# Patient Record
Sex: MALE | Race: WHITE | NOT HISPANIC OR LATINO | Employment: STUDENT | URBAN - METROPOLITAN AREA
[De-identification: names, ages, dates, MRNs, and addresses within clinical notes are randomized per-mention and may not be internally consistent; named-entity substitution may affect disease eponyms.]

---

## 2017-01-16 ENCOUNTER — TELEPHONE (OUTPATIENT)
Dept: PEDIATRICS | Facility: CLINIC | Age: 22
End: 2017-01-16

## 2017-01-16 NOTE — TELEPHONE ENCOUNTER
----- Message from Huong Figueredo sent at 1/16/2017 11:20 AM CST -----  Contact: Mom Carol   Needs copy of Shot Record for Chago Douglas 1995 and Cameron Douglas 4/6/1998. Mom would like 2 copies of each one. Thanks

## 2017-06-10 ENCOUNTER — TELEPHONE (OUTPATIENT)
Dept: PEDIATRICS | Facility: CLINIC | Age: 22
End: 2017-06-10

## 2017-06-10 NOTE — TELEPHONE ENCOUNTER
----- Message from Carmen Posey sent at 6/10/2017  8:44 AM CDT -----  Contact: Carol santo [phone]  Mom is calling to get an updated shot record

## 2017-07-25 ENCOUNTER — OFFICE VISIT (OUTPATIENT)
Dept: PEDIATRICS | Facility: CLINIC | Age: 22
End: 2017-07-25
Payer: COMMERCIAL

## 2017-07-25 VITALS
WEIGHT: 157.88 LBS | BODY MASS INDEX: 22.1 KG/M2 | SYSTOLIC BLOOD PRESSURE: 129 MMHG | HEART RATE: 67 BPM | DIASTOLIC BLOOD PRESSURE: 67 MMHG | HEIGHT: 71 IN

## 2017-07-25 DIAGNOSIS — F90.2 ATTENTION DEFICIT HYPERACTIVITY DISORDER (ADHD), COMBINED TYPE: ICD-10-CM

## 2017-07-25 PROCEDURE — 99213 OFFICE O/P EST LOW 20 MIN: CPT | Mod: S$GLB,,, | Performed by: PEDIATRICS

## 2017-07-25 RX ORDER — AZITHROMYCIN 250 MG/1
TABLET, FILM COATED ORAL
Refills: 0 | COMMUNITY
Start: 2017-07-17 | End: 2019-03-01 | Stop reason: ALTCHOICE

## 2017-07-25 RX ORDER — OXYCODONE AND ACETAMINOPHEN 7.5; 325 MG/1; MG/1
TABLET ORAL
Refills: 0 | COMMUNITY
Start: 2017-07-17 | End: 2019-03-01

## 2017-07-25 RX ORDER — ONDANSETRON 4 MG/1
TABLET, ORALLY DISINTEGRATING ORAL
Refills: 0 | COMMUNITY
Start: 2017-07-17 | End: 2019-03-01

## 2017-07-25 RX ORDER — DEXTROAMPHETAMINE SACCHARATE, AMPHETAMINE ASPARTATE MONOHYDRATE, DEXTROAMPHETAMINE SULFATE AND AMPHETAMINE SULFATE 2.5; 2.5; 2.5; 2.5 MG/1; MG/1; MG/1; MG/1
10 CAPSULE, EXTENDED RELEASE ORAL DAILY
Qty: 30 CAPSULE | Refills: 0 | Status: SHIPPED | OUTPATIENT
Start: 2017-07-25 | End: 2017-12-02 | Stop reason: SDUPTHER

## 2017-07-25 NOTE — PROGRESS NOTES
Subjective:     History of Present Illness:  Chago Douglas is a 21 y.o. male who presents to the clinic today for medication check (tanisha and bm- good. nehal in college.  brought in by self)     History was provided by the patient. Pt well known to practice.  Chago here for a med check-taking Adderall XR 10 mg daily. Will be a nehal at  this year. Was doing well on this dose last year, no c/o side effects. Was sleeping and eating well. Normal growth curve, normal BP.    Review of Systems   Constitutional: Negative.    HENT: Negative.    Respiratory: Negative.    Neurological: Negative.    Psychiatric/Behavioral: Negative.        Objective:     Physical Exam   Constitutional: He is oriented to person, place, and time. He appears well-developed and well-nourished.   Cardiovascular: Normal rate.    Pulmonary/Chest: Effort normal and breath sounds normal.   Neurological: He is alert and oriented to person, place, and time.   Skin: Skin is warm and dry.       Assessment and Plan:     Attention deficit hyperactivity disorder (ADHD), combined type  -     dextroamphetamine-amphetamine (ADDERALL XR) 10 MG 24 hr capsule; Take 1 capsule (10 mg total) by mouth once daily.  Dispense: 30 capsule; Refill: 0        Return in about 6 months (around 1/25/2018).

## 2017-12-02 DIAGNOSIS — F90.2 ATTENTION DEFICIT HYPERACTIVITY DISORDER (ADHD), COMBINED TYPE: ICD-10-CM

## 2017-12-02 RX ORDER — DEXTROAMPHETAMINE SACCHARATE, AMPHETAMINE ASPARTATE MONOHYDRATE, DEXTROAMPHETAMINE SULFATE AND AMPHETAMINE SULFATE 2.5; 2.5; 2.5; 2.5 MG/1; MG/1; MG/1; MG/1
10 CAPSULE, EXTENDED RELEASE ORAL DAILY
Qty: 30 CAPSULE | Refills: 0 | Status: SHIPPED | OUTPATIENT
Start: 2017-12-02 | End: 2017-12-05 | Stop reason: SDUPTHER

## 2017-12-02 NOTE — TELEPHONE ENCOUNTER
----- Message from Huong Figueredo sent at 12/2/2017 10:08 AM CST -----  Contact: Hieu Medina   Refill on ADDERALL XR 10mg--#23--[facility]. (PLEASE NOTE THIS IS A NEW PHARMACY THAT IS NOT LISTED IN CHART) Thanks

## 2017-12-05 ENCOUNTER — DOCUMENTATION ONLY (OUTPATIENT)
Dept: PEDIATRICS | Facility: CLINIC | Age: 22
End: 2017-12-05

## 2017-12-05 DIAGNOSIS — F90.2 ATTENTION DEFICIT HYPERACTIVITY DISORDER (ADHD), COMBINED TYPE: ICD-10-CM

## 2017-12-05 RX ORDER — DEXTROAMPHETAMINE SACCHARATE, AMPHETAMINE ASPARTATE MONOHYDRATE, DEXTROAMPHETAMINE SULFATE AND AMPHETAMINE SULFATE 2.5; 2.5; 2.5; 2.5 MG/1; MG/1; MG/1; MG/1
10 CAPSULE, EXTENDED RELEASE ORAL DAILY
Qty: 30 CAPSULE | Refills: 0 | Status: SHIPPED | OUTPATIENT
Start: 2017-12-05 | End: 2018-07-23

## 2017-12-05 NOTE — TELEPHONE ENCOUNTER
----- Message from Savi Rock sent at 12/5/2017  8:50 AM CST -----  Contact: hansa Carol   Mom called a prescription in on  Saturday. Adderall xr 10 mg #23. It was called in to the wrong pharmacy. Mom wants this one prescription sent to a new pharmacy. [facility] in [place].

## 2017-12-05 NOTE — PROGRESS NOTES
Pa was approved and faxed to [facility]. Exp on 12/04/18    PA FOR ADDERALL 10MG WAS SUBMITTED ON [facility]OVER [facility]

## 2018-05-08 ENCOUNTER — TELEPHONE (OUTPATIENT)
Dept: PEDIATRICS | Facility: CLINIC | Age: 23
End: 2018-05-08

## 2018-05-08 NOTE — TELEPHONE ENCOUNTER
----- Message from Carmen Posey sent at 5/8/2018  3:09 PM CDT -----  Contact: Carol santo [phone]  Needs rx refill dextroamphetamine-amphetamine (ADDERALL XR) 10 MG 24 hr capsule, [facility] [id number] [facility], [place] - [address] AT [facility] [address], Dr Jones writes the child's rx

## 2018-07-02 DIAGNOSIS — M25.511 ACUTE PAIN OF RIGHT SHOULDER: Primary | ICD-10-CM

## 2018-07-03 ENCOUNTER — CLINICAL SUPPORT (OUTPATIENT)
Dept: REHABILITATION | Facility: HOSPITAL | Age: 23
End: 2018-07-03
Attending: ORTHOPAEDIC SURGERY
Payer: COMMERCIAL

## 2018-07-03 DIAGNOSIS — M25.511 ACUTE PAIN OF RIGHT SHOULDER: Primary | ICD-10-CM

## 2018-07-03 PROCEDURE — 97110 THERAPEUTIC EXERCISES: CPT

## 2018-07-03 PROCEDURE — 97161 PT EVAL LOW COMPLEX 20 MIN: CPT

## 2018-07-03 NOTE — PLAN OF CARE
[facility]  Physical Therapy Initial Evaluation    Name: Chago Douglas  Clinic Number: [id number]    Therapy Diagnosis:   Encounter Diagnosis   Name Primary?    Acute pain of right shoulder Yes     Physician: Genesis Nelson MD    Physician Orders: PT Eval and Treat    Medical Diagnosis: Acute pain of the right shoulder   Date of Surgery:nil  Evaluation Date: 7/3/2018  Authorization Period Expiration: 12/31/18  Plan of Care Certification Period: 10/3/18  Visit # / Visits authorized: 1/ 25    Time In: 905am  Time Out: 1000  Total Billable Time: 45 minutes    Precautions: Standard    Subjective   Date of onset: three weeks  History of current condition - Chago reports:  Began throwing in preparation for summer league ball and noticed right shoulder pain.  Pt states that he did rest and attempted to throw again but continued to have right shoulder soreness.  Denies history of right shoulder pain.  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         No past medical history on file.  Chago Douglas  has no past surgical history on file.    Chago has a current medication list which includes the following prescription(s): azithromycin, dextroamphetamine-amphetamine, ondansetron, and oxycodone-acetaminophen.    Review of patient's allergies indicates:   Allergen Reactions    Augmentin [amoxicillin-pot clavulanate]         Imaging, none:      Prior Therapy: no  Social History:   lives with their family  Occupation: student   Prior Level of Function: pain free throwing  Current Level of Function: unable to throw without pain in the right shoulder.     Pain:  Current 0/10, worst 8/10, best 0/10   Location: right shoulder   Description: Sharp  Aggravating Factors: throwing   Easing Factors: rest    Pts goals: pain free throwing     Objective       Neurologicalc Screening- Sensory  Right   Left      LEVEL  WNL  Dim.  Absent  WNL  Dim.  Absent    L1 (inguinal area)  x   x      L2 (anterior mid-thigh)  x   x     L3 (distal anterior thigh)  x   x     L4 (medial lower leg/foot)  x   x     L5 (lateral leg/ foot)  x   x     S1 (lateral side of foot)  x   x                       Neurologicalc Screening- Sensory        LEVEL  WNL  Dim.  Absent  WNL  Dim.  Absent    C4 (Skin over AC jt) x   x     C5 (radial side of elbow) x   x     C6 (Dorsal surface of thumb) x   x     C7 (Dorsal 3rd digit) x   x     C8 (Dorsal 5th digit) x   x             Myotomes:   Myotome  RIGHT    Left     MUSCLE TEST  WNL  Dim.  Pain  WNL  Dim.  Pain    Shoulder Abduction (C5) x   x     Elbow Flex (C6) x   x     Wrist Ext (C7) x   x     Finger Flex (C8) x   x     Finger Abd (TI) x   x     Hip Flexion (L2-L3)  x   x     Knee Extension (L3-L4)  x   x     Dorsiflexion (L4)  x   x     Hallux Extension (L5)  x   x     Ankle Eversion (S1-S2)  x   x            DTR WNL  Dim.  Absent    Right Bicep x     Left Bicep x     Right Tricep x     Left Tricep x     Right Brachiorad x     Left Brachiorad x     Right-Quad  x     Left-Quad  x     Right Ankle  x     Left Ankle  x          ROM: Active/PROM     Cervical Range of Motion Right  Left  Norms(°)   Rotation WNL  90   Side Bending WNL  25-40   Flexion WNL  80   Extension  WNL   70       Shoulder ROM  Right  Left    Flexion 175 175   Abduction  160 160   Extension  10 10   IR in 90 Abd 40 70   ER in 90 Abd 120 90   Total motion  160 160   Horiz Add  10 20               Strength:     Shoulder  MMT Right  Left    Flexion 5/5 5/5   Abduction  5/5 5/5   Adduction  5/5 5/5   ER 4/5 5/5   IR 5/5 5/5   Scaption  4/5 5/5   Horiz Abd 4/5 5/5   Horiz ADD  5/5 5/5   Extension  5/5 5/5   ER at 90/90 4/5 5/5         Special Tests:    Special Test Shoulder  Right  Left    Yergason's neg neg   Ant Slide neg neg   Compression Rotation  neg neg   Apprehesion  neg neg   Biceps Load II  neg neg   Over 60 yrs neg neg   Infraspinatus Test  neg neg   Painful Arc  pos neg   Drop Arm  neg neg   Khushi  Nba  neg neg   Active Compression  neg neg        TOS Special Test  Right  Left    Adson's  neg neg   Hyperabduction  neg neg   Chhaya neg neg   Tinels neg neg   Costoclavicular  neg neg       Bilateral SLR for tight hamstrings   Juan C test + for hip flexor, quad, and IT band tightness   +obers bilateral               CMS Impairment/Limitation/Restriction for FOTO Shoulder Survey  Status Limitation G-Code CMS Severity Modifier  Intake 83% 17% Current Status CI - At least 1 percent but less than 20 percent  Predicted 89% 11% Goal Status+ CI - At least 1 percent but less than 20 percent  D/C Status CI **only report if this is a one time visit    Therapist reviewed FOTO scores for Chago Douglas on 7/3/2018.   FOTO documents entered into [employer] - see Media section.    Limitation Score: 17  Category: Carrying    Current : CI = at least 1% but < 20% impaired, limited or restricted  Goal: CI = at least 1% but < 20% impaired, limited or restricted  Discharge: na         TREATMENT   Treatment Time In: 930  Treatment Time Out: 1000  Total Treatment time separate from Evaluation time:30    Chago received therapeutic exercises to develop strength, endurance, ROM and flexibility for 15 minutes including:  SL ER 2lbs 3x15   IR stretch 3 min   Cross body stretch with scap stabilized 3 min       Chago received the following manual therapy techniques: Joint mobilizations were applied to the: right shoulder for 15 minutes, including:  Scap mobilization 5 min   GH a/p gd IV 3 min   Tspine a/p at CT junction 3 min       Chago received cold pack for 10 minutes to to decrease circulation, pain, and swelling.    Education provided re: rest    Written Home Exercises Provided: no.      See EMR under patient instructions for exercises given.   Assessment   Chago is a 22 y.o. male referred to outpatient Physical Therapy with a medical diagnosis of acute right shoulder pain. Pt presents with signs and symptoms associated with inflammation of the  right shoulder, possibly the RTC and biceps tendon.  Pt states that he took 2-3 weeks off from throwing and attempted to throw in a summer league game with only three days of arm prep.  He denies performing and RTC/Scap exercises during those three off weeks.  Right shoulder inflammation may be associated with inappropriate acute:chronic workload ratio.  He was advised to rest from throwing x 4 weeks and will be seen here for PT.      Pt prognosis is Good.   Pt will benefit from skilled outpatient Physical Therapy to address the deficits stated above and in the chart below, provide pt/family education, and to maximize pt's level of independence.     Plan of care discussed with patient: Yes  Pt's spiritual, cultural and educational needs considered and patient is agreeable to the plan of care and goals as stated below:     Anticipated Barriers for therapy: none    Medical Necessity is demonstrated by the following  History  Co-morbidities and personal factors that may impact the plan of care Co-morbidities:   none    Personal Factors:   no deficits     low   Examination  Body Structures and Functions, activity limitations and participation restrictions that may impact the plan of care Body Regions:   upper extremities    Body Systems:    ROM  Strength    Participation Restrictions:   none    Activity limitations:   Learning and applying knowledge  no deficits    General Tasks and Commands  no deficits    Communication  no deficits    Mobility  no deficits    Self care  no deficits    Domestic Life  no deficits    Interactions/Relationships  no deficits    Life Areas  no deficits    Community and Social Life  no deficits         low   Clinical Presentation stable and uncomplicated low   Decision Making/ Complexity Score: low     Goals:  Short Term GOALS:  In 4-8 weeks, pt. will:  1. Decrease overall pain to 0-2/10 in the right shoulder with throwing out to 60'    2. Increase strength to the 4+/5 in the right shoulder  grossly throughout,  in order to perform ADLs and IADLs more effectively  3. Improve FOTO intake score by 10 to demonstrate improvement with functional mobility and use  4. Independent with HEP  Long Term GOALS:  In 8-12 weeks, pt. will:    1. Decrease overall pain to 0-1/10 in the right shoulder on average with throwing out to 120'   2. Increase strength to 5/5 in the right shoulder grossly throughout,  in order to perform ADLs and IADLs more effectively   3. Improve FOTO intake score to 10 to demonstrate improvement with functional mobility and use  4. Independent with HEP  5. Return to full throwing unrestricted       Plan   Certification Period/Plan of care expiration: 7/3/2018 to 10/3/18.    Outpatient Physical Therapy 2 times weekly for 10 weeks to include the following interventions: Manual Therapy, Moist Heat/ Ice, Neuromuscular Re-ed, Patient Education, Therapeutic Activites and Therapeutic Exercise.     Marcellus Faustin, PT

## 2018-07-16 ENCOUNTER — CLINICAL SUPPORT (OUTPATIENT)
Dept: REHABILITATION | Facility: HOSPITAL | Age: 23
End: 2018-07-16
Attending: ORTHOPAEDIC SURGERY
Payer: COMMERCIAL

## 2018-07-16 DIAGNOSIS — M25.60 DECREASED RANGE OF MOTION: ICD-10-CM

## 2018-07-16 PROCEDURE — 97110 THERAPEUTIC EXERCISES: CPT

## 2018-07-17 PROBLEM — M25.60 DECREASED RANGE OF MOTION: Status: ACTIVE | Noted: 2018-07-17

## 2018-07-17 NOTE — PROGRESS NOTES
Physical Therapy Daily Treatment Note     Name: Chago Douglas  Clinic Number: [id number]    Therapy Diagnosis:   Encounter Diagnosis   Name Primary?    Decreased range of motion      Physician: Genesis Nelson MD  Physician: Genesis Nelson MD     Physician Orders: PT Eval and Treat    Medical Diagnosis: Acute pain of the right shoulder   Date of Surgery:nil  Evaluation Date: 7/3/2018  Authorization Period Expiration: 12/31/18  Plan of Care Certification Period: 10/3/18  Visit # / Visits authorized: 2/ 25  Precautions: Standard  Visit Date: 7/16/2018       Time In: 1000  Time Out: 1100  Total Billable Time: 45 minutes    Precautions: Standard    Subjective      Pt reports: he was compliant with home exercise program given last session.   Response to previous treatment:good, no change in functional status  Functional change: nil    Pain: 0/10  Location: right shoulder      Objective     Chago received therapeutic exercises to develop strength, endurance, ROM and flexibility for 40 minutes including:  Shoulder ROM within the doctors protocol  submax isometrics in the sling   Shoulder scap retract,protract, depression, elevation 30x each   Forearm exercises 30x isotonics 2lbs  Power web 3 min       Home Exercises Provided and Patient Education Provided     Education provided:   - no, not needed    Written Home Exercises Provided: see above.  Exercises were reviewed and Chago was able to demonstrate them prior to the end of the session.  Chago demonstrated good  understanding of the education provided.     Pt received a written copy of exercises to perform at home.   See EMR under patient instructions for exercises given.     Chago demonstrated good  understanding of the education provided.     Assessment     Tolerated treatment session well.  Pt has been doing her exercises at home.  No restrictions noted with ROM at this time.     Chago is progressing well towards his goals.   Pt prognosis is Excellent.      Pt will continue to benefit from skilled outpatient physical therapy to address the deficits listed in the problem list box on initial evaluation, provide pt/family education and to maximize pt's level of independence in the home and community environment.     Pt's spiritual, cultural and educational needs considered and pt agreeable to plan of care and goals.    Anticipated barriers to physical therapy: none      Goals: return to sport    Plan     Continue PT as planned and progress accordingly.    Marcellus Faustin, PT

## 2018-07-19 ENCOUNTER — CLINICAL SUPPORT (OUTPATIENT)
Dept: REHABILITATION | Facility: HOSPITAL | Age: 23
End: 2018-07-19
Attending: ORTHOPAEDIC SURGERY
Payer: COMMERCIAL

## 2018-07-19 DIAGNOSIS — M25.60 DECREASED RANGE OF MOTION: ICD-10-CM

## 2018-07-19 PROCEDURE — 97110 THERAPEUTIC EXERCISES: CPT

## 2018-07-19 NOTE — PROGRESS NOTES
Physical Therapy Daily Treatment Note     Name: Chago Douglas  Clinic Number: [id number]    Therapy Diagnosis:   Encounter Diagnosis   Name Primary?    Decreased range of motion      Physician: Genesis Nelson MD  Physician: Genesis eNlson MD     Physician Orders: PT Eval and Treat    Medical Diagnosis: Acute pain of the right shoulder   Date of Surgery:nil  Evaluation Date: 7/3/2018  Authorization Period Expiration: 12/31/18  Plan of Care Certification Period: 10/3/18  Visit # / Visits authorized: 2/ 25  Precautions: Standard  Visit Date: 7/19/2018       Time In: 900am  Time Out: 1000  Total Billable Time: 45 minutes    Precautions: Standard    Subjective      Pt reports: he was compliant with home exercise program given last session.   Response to previous treatment:good, no change in functional status  Functional change: nil    Pain: 0/10  Location: right shoulder      Objective     Chago received therapeutic exercises to develop strength, endurance, ROM and flexibility for 45 minutes including:   UBE 7 min for w/u   ER and IR isometrics with blue tubing 3x30 sec neutral and in 90/90 position  D2 eccentrics with back on wall, 3x10- resistance was RPE 8  ER negatvies 30x blue tubing   Sudanese get up 5x bilateral   ER isometric with pilates ring 3x30 sec       Home Exercises Provided and Patient Education Provided     Education provided:   - no, not needed    Written Home Exercises Provided: see above.  Exercises were reviewed and Chago was able to demonstrate them prior to the end of the session.  Chago demonstrated good  understanding of the education provided.     Pt received a written copy of exercises to perform at home.   See EMR under patient instructions for exercises given.     Chago demonstrated good  understanding of the education provided.     Assessment     Tolerated treatment session well.  Pt will begin throwing program on the week of 7/30/18    Chago is progressing well towards his  goals.   Pt prognosis is Excellent.     Pt will continue to benefit from skilled outpatient physical therapy to address the deficits listed in the problem list box on initial evaluation, provide pt/family education and to maximize pt's level of independence in the home and community environment.     Pt's spiritual, cultural and educational needs considered and pt agreeable to plan of care and goals.    Anticipated barriers to physical therapy: none      Goals: return to sport    Plan     Continue PT as planned and progress accordingly.    Marcellus Faustin, PT

## 2018-07-23 ENCOUNTER — OFFICE VISIT (OUTPATIENT)
Dept: PEDIATRICS | Facility: CLINIC | Age: 23
End: 2018-07-23
Payer: COMMERCIAL

## 2018-07-23 VITALS
HEART RATE: 73 BPM | BODY MASS INDEX: 23.35 KG/M2 | SYSTOLIC BLOOD PRESSURE: 123 MMHG | OXYGEN SATURATION: 98 % | HEIGHT: 70 IN | TEMPERATURE: 97 F | WEIGHT: 163.13 LBS | DIASTOLIC BLOOD PRESSURE: 79 MMHG

## 2018-07-23 DIAGNOSIS — F90.2 ATTENTION DEFICIT HYPERACTIVITY DISORDER (ADHD), COMBINED TYPE: Primary | ICD-10-CM

## 2018-07-23 PROCEDURE — 99214 OFFICE O/P EST MOD 30 MIN: CPT | Mod: S$GLB,,, | Performed by: PEDIATRICS

## 2018-07-23 PROCEDURE — 3008F BODY MASS INDEX DOCD: CPT | Mod: CPTII,S$GLB,, | Performed by: PEDIATRICS

## 2018-07-23 RX ORDER — DEXTROAMPHETAMINE SACCHARATE, AMPHETAMINE ASPARTATE MONOHYDRATE, DEXTROAMPHETAMINE SULFATE AND AMPHETAMINE SULFATE 3.75; 3.75; 3.75; 3.75 MG/1; MG/1; MG/1; MG/1
15 CAPSULE, EXTENDED RELEASE ORAL DAILY
Qty: 30 CAPSULE | Refills: 0 | Status: SHIPPED | OUTPATIENT
Start: 2018-07-23 | End: 2018-10-24 | Stop reason: SDUPTHER

## 2018-07-23 RX ORDER — NEOMYCIN SULFATE, POLYMYXIN B SULFATE AND DEXAMETHASONE 3.5; 10000; 1 MG/ML; [USP'U]/ML; MG/ML
SUSPENSION/ DROPS OPHTHALMIC
Refills: 1 | COMMUNITY
Start: 2018-04-30 | End: 2019-03-01 | Stop reason: ALTCHOICE

## 2018-07-23 NOTE — PROGRESS NOTES
Subjective:     History of Present Illness:  Chago Douglas is a 22 y.o. male who presents to the clinic today for med ck (tanisha not so good and bm good      brought in by self )     History was provided by the patient. Pt well known to the practice. Here for a med check.  Chago here for a med check. 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 Been taking a break over the summer. Was taking Adderall XR 10 mg. Pt reports that he felt tired when he took the medication. No c/o HA or stomach ache. Appetite was slightly decreased with medication. Normal BP, normal weight. Would like to increase dose.     Review of Systems   Constitutional: Negative.    HENT: Negative.    Respiratory: Negative.    Neurological: Negative.    Psychiatric/Behavioral: Positive for decreased concentration. Negative for behavioral problems. The patient is not hyperactive.        Objective:     Physical Exam   Constitutional: He is oriented to person, place, and time. He appears well-developed and well-nourished.   HENT:   Head: Normocephalic and atraumatic.   Cardiovascular: Normal rate, regular rhythm and normal heart sounds.    Pulmonary/Chest: Effort normal and breath sounds normal.   Neurological: He is oriented to person, place, and time.       Assessment and Plan:     Attention deficit hyperactivity disorder (ADHD), combined type  -     dextroamphetamine-amphetamine (ADDERALL XR) 15 MG 24 hr capsule; Take 1 capsule (15 mg total) by mouth once daily.  Dispense: 30 capsule; Refill: 0      Will call with an update in 2 weeks    Follow-up in about 6 months (around 1/23/2019).

## 2018-07-30 ENCOUNTER — DOCUMENTATION ONLY (OUTPATIENT)
Dept: SPORTS MEDICINE | Facility: CLINIC | Age: 23
End: 2018-07-30

## 2018-07-30 ENCOUNTER — CLINICAL SUPPORT (OUTPATIENT)
Dept: REHABILITATION | Facility: HOSPITAL | Age: 23
End: 2018-07-30
Attending: ORTHOPAEDIC SURGERY
Payer: COMMERCIAL

## 2018-07-30 DIAGNOSIS — M25.511 ACUTE PAIN OF RIGHT SHOULDER: ICD-10-CM

## 2018-07-30 DIAGNOSIS — M25.60 DECREASED RANGE OF MOTION: Primary | ICD-10-CM

## 2018-07-30 PROCEDURE — 97110 THERAPEUTIC EXERCISES: CPT

## 2018-07-30 PROCEDURE — 97140 MANUAL THERAPY 1/> REGIONS: CPT

## 2018-07-30 NOTE — PROGRESS NOTES
[facility], Right shoulder internal impingement, scap dyskenesia, biceps tendonitis    PT with Marcellus  Dry needling  Recheck in two weeks before reporting

## 2018-07-31 NOTE — PROGRESS NOTES
"                            Physical Therapy Daily Treatment Note     Name: Chago Douglas  Clinic Number: [id number]    Therapy Diagnosis:   No diagnosis found.  Physician: Genesis Nelson MD  Physician: Genesis Nelson MD     Physician Orders: PT Eval and Treat    Medical Diagnosis: Acute pain of the right shoulder   Date of Surgery:nil  Evaluation Date: 7/3/2018  Authorization Period Expiration: 12/31/18  Plan of Care Certification Period: 10/3/18  Visit # / Visits authorized: 4/ 25  Precautions: Standard  Visit Date: 7/30/2018       Time In: 910am  Time Out: 1000  Total Billable Time: 55 minutes    Precautions: Standard    Subjective      Pt reports: he was compliant with home exercise program given last session.   Response to previous treatment:good, no change in functional status  Functional change: nil    Pain: 0/10, however pt feels a "click" in the right shoulder at times.   Location: right shoulder      Objective     Chago received therapeutic exercises to develop strength, endurance, ROM and flexibility for 45 minutes including:   UBE 7 min for w/u   ER and IR isometrics with blue tubing 3x30 sec neutral and in 90/90 position  D2 eccentrics with back on wall, 3x10- resistance was RPE 8  ER negatvies 30x blue tubing   MRE shoulder manuals right shoulder 5 min       Manual Therapy 15 min   Right shoulder a/p gh Gd IV  Scap mobs in all directions   HVLA CT junction and right rib cage         Home Exercises Provided and Patient Education Provided     Education provided:   - no, not needed    Written Home Exercises Provided: see above.  Exercises were reviewed and Chago was able to demonstrate them prior to the end of the session.  Chago demonstrated good  understanding of the education provided.     Pt received a written copy of exercises to perform at home.   See EMR under patient instructions for exercises given.     Chago demonstrated good  understanding of the education provided.     Assessment     Tolerated treatment " session well.  Spoke with pt about dry needling to the right shoulder, however pt was not interested.    Pt will begin throwing program on next visit.      Chago is progressing well towards his goals.   Pt prognosis is Excellent.     Pt will continue to benefit from skilled outpatient physical therapy to address the deficits listed in the problem list box on initial evaluation, provide pt/family education and to maximize pt's level of independence in the home and community environment.     Pt's spiritual, cultural and educational needs considered and pt agreeable to plan of care and goals.    Anticipated barriers to physical therapy: none      Goals: return to sport    Plan     Continue PT as planned and progress accordingly.    Marcellus Faustin, PT

## 2018-08-03 ENCOUNTER — CLINICAL SUPPORT (OUTPATIENT)
Dept: REHABILITATION | Facility: HOSPITAL | Age: 23
End: 2018-08-03
Attending: ORTHOPAEDIC SURGERY
Payer: COMMERCIAL

## 2018-08-03 DIAGNOSIS — M25.60 DECREASED RANGE OF MOTION: ICD-10-CM

## 2018-08-03 PROCEDURE — 97110 THERAPEUTIC EXERCISES: CPT

## 2018-08-03 PROCEDURE — 97150 GROUP THERAPEUTIC PROCEDURES: CPT

## 2018-08-03 NOTE — PROGRESS NOTES
"                            Physical Therapy Daily Treatment Note     Name: Chago Douglas  Clinic Number: [id number]    Therapy Diagnosis:   Encounter Diagnosis   Name Primary?    Decreased range of motion      Physician: Genesis Nelson MD  Physician: Genesis Nelson MD     Physician Orders: PT Eval and Treat    Medical Diagnosis: Acute pain of the right shoulder   Date of Surgery:nil  Evaluation Date: 7/3/2018  Authorization Period Expiration: 12/31/18  Plan of Care Certification Period: 10/3/18  Visit # / Visits authorized: 4/ 25  Precautions: Standard  Visit Date: 8/3/2018       Time In: 910am  Time Out: 1000  Total Billable Time: 45 minutes    Precautions: Standard    Subjective      Pt reports: he was compliant with home exercise program given last session.   Response to previous treatment:good, no change in functional status  Functional change: nil    Pain: 0/10, however pt feels a "click" in the right shoulder at times.   Location: right shoulder      Objective     Chago received therapeutic exercises to develop strength, endurance, ROM and flexibility for 35 minutes including:   UBE 7 min for w/u   ER and IR isometrics with blue tubing 3x30 sec neutral and in 90/90 position  D2 eccentrics with back on wall, 3x10- resistance was RPE 8  ER negatvies 30x blue tubing   MRE shoulder manuals right shoulder 5 min   T-spine mobilization/stretches for rotation  pec stretch on foam roll 3 min         Therapeutic activities 15 min  Throwing program out to 45'        Home Exercises Provided and Patient Education Provided     Education provided:   - no, not needed    Written Home Exercises Provided: see above.  Exercises were reviewed and Chago was able to demonstrate them prior to the end of the session.  Chago demonstrated good  understanding of the education provided.     Pt received a written copy of exercises to perform at home.   See EMR under patient instructions for exercises given.     Chago demonstrated good  understanding of " "the education provided.     Assessment     Tolerated treatment session well.  We did throw today out to 45', no complaints of pain in the right shoulder, however pt does have some  throwing characteristics that may predispose pt to shoulder pain.  He was advised on throwing across his body and his arm being "late" exposing his shoulder to increases in stress.  Furthermore, pts arm delivery is lower then 3/4 which tends to increase stress on the shoulder as well.        Chago is progressing well towards his goals.   Pt prognosis is Excellent.     Pt will continue to benefit from skilled outpatient physical therapy to address the deficits listed in the problem list box on initial evaluation, provide pt/family education and to maximize pt's level of independence in the home and community environment.     Pt's spiritual, cultural and educational needs considered and pt agreeable to plan of care and goals.    Anticipated barriers to physical therapy: none      Goals: return to sport    Plan     Continue PT as planned and progress throwing program as tolerated.   Marcellus Faustin, PT   "

## 2018-10-12 ENCOUNTER — TELEPHONE (OUTPATIENT)
Dept: SPORTS MEDICINE | Facility: CLINIC | Age: 23
End: 2018-10-12

## 2018-10-12 NOTE — TELEPHONE ENCOUNTER
Spoke to Dad about son's outpatient MRI arthrogram from [facility]. MRI arthrogram was reviewed with Dr. Nelson.     MRI showed posterior labral fraying. Posterior rotator cuff fraying (consistent with internal impingement) and biceps tendinitis.     Explained this to father and that we don't have anything surgical to offer his son for these issues.     Dr. Nelson recommends continued PT and pitching mechanics work and biceps tendon steroid injection to help improve symptoms.     Duran can continue activity and baseball as tolerated per pain.     I advised dad that Dr. Nelson states that this shoulder issue will likely flare up again at some point in the season and duran will have to continue to work through it.     I also discussed with Dad that if Duran has a medical redshirt available then it might be an option to take it and continue shoulder and mechanics work in PT for an extended period of time to improve his ability to be effective and pain-free for his senior season.     Dad expressed understanding.

## 2018-10-24 ENCOUNTER — TELEPHONE (OUTPATIENT)
Dept: PEDIATRICS | Facility: CLINIC | Age: 23
End: 2018-10-24

## 2018-10-24 DIAGNOSIS — F90.2 ATTENTION DEFICIT HYPERACTIVITY DISORDER (ADHD), COMBINED TYPE: ICD-10-CM

## 2018-10-24 RX ORDER — DEXTROAMPHETAMINE SACCHARATE, AMPHETAMINE ASPARTATE MONOHYDRATE, DEXTROAMPHETAMINE SULFATE AND AMPHETAMINE SULFATE 3.75; 3.75; 3.75; 3.75 MG/1; MG/1; MG/1; MG/1
15 CAPSULE, EXTENDED RELEASE ORAL DAILY
Qty: 30 CAPSULE | Refills: 0 | Status: SHIPPED | OUTPATIENT
Start: 2018-10-24 | End: 2019-10-24

## 2018-10-24 NOTE — TELEPHONE ENCOUNTER
Spoke with mom, informed her that one refill of Adderall was called into pharmacy and that this was the last one that would be called in. Mom voiced understanding.    Returned moms call. Informed that I will forward to     ----- Message from Emily Hicks sent at 10/24/2018 12:29 PM CDT -----  Contact: [phone] Carol  Pt just turned 23 and mom would like meds for pt until he can find an adult Dr.     Please call mom

## 2018-11-19 ENCOUNTER — OFFICE VISIT (OUTPATIENT)
Dept: SPORTS MEDICINE | Facility: CLINIC | Age: 23
End: 2018-11-19
Payer: COMMERCIAL

## 2018-11-19 VITALS
WEIGHT: 160 LBS | DIASTOLIC BLOOD PRESSURE: 90 MMHG | BODY MASS INDEX: 22.4 KG/M2 | HEIGHT: 71 IN | SYSTOLIC BLOOD PRESSURE: 158 MMHG | HEART RATE: 121 BPM

## 2018-11-19 DIAGNOSIS — M25.511 ACUTE PAIN OF RIGHT SHOULDER: Primary | ICD-10-CM

## 2018-11-19 PROCEDURE — 99999 PR PBB SHADOW E&M-EST. PATIENT-LVL III: CPT | Mod: PBBFAC,,, | Performed by: ORTHOPAEDIC SURGERY

## 2018-11-19 PROCEDURE — 99212 OFFICE O/P EST SF 10 MIN: CPT | Mod: 25,S$GLB,, | Performed by: ORTHOPAEDIC SURGERY

## 2018-11-19 PROCEDURE — 3008F BODY MASS INDEX DOCD: CPT | Mod: CPTII,S$GLB,, | Performed by: ORTHOPAEDIC SURGERY

## 2018-11-19 PROCEDURE — 20550 NJX 1 TENDON SHEATH/LIGAMENT: CPT | Mod: RT,S$GLB,, | Performed by: ORTHOPAEDIC SURGERY

## 2018-11-19 RX ORDER — METHYLPREDNISOLONE ACETATE 80 MG/ML
40 INJECTION, SUSPENSION INTRA-ARTICULAR; INTRALESIONAL; INTRAMUSCULAR; SOFT TISSUE
Status: DISCONTINUED | OUTPATIENT
Start: 2018-11-19 | End: 2018-11-19 | Stop reason: HOSPADM

## 2018-11-19 RX ADMIN — METHYLPREDNISOLONE ACETATE 40 MG: 80 INJECTION, SUSPENSION INTRA-ARTICULAR; INTRALESIONAL; INTRAMUSCULAR; SOFT TISSUE at 04:11

## 2018-11-19 NOTE — PROGRESS NOTES
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 Posterior rotator cuff fraying (consistent with internal impingement) and biceps tendinitis.     Explained this to father and that we don't have anything surgical to offer his son for these issues.     Dr. Nelson recommends continued PT and pitching mechanics work and biceps tendon steroid injection to help improve symptoms.     Duran can continue activity and baseball as tolerated per pain.     I advised dad that Dr. Nelson states that this shoulder issue will likely flare up again at some point in the season and duran will have to continue to work through it.     I also discussed with Dad that if Duran has a medical redshirt available then it might be an option to take it and continue shoulder and mechanics work in PT for an extended period of time to improve his ability to be effective and pain-free for his senior season.       PROCEDURE NOTE:  right shoulder injection  4:4:2 MARCAINE/LIDOCAINE/KENALOG  After informed consent was obtained and patient was prepped and draped in sterile fashion using betadine solution, using an 18 gauge needle, 4cc's of Marcaine .5%, 4cc's of Lidocaine and 2cc's of 40mg Kenalog was injected into the shoulder BICEPS withthout complications. Bandaid was applied. Patient was reminded to rest with RICE for 48 hours post injection and to call clinic immediately for any adverse side effects as explained in clinic today.  80% better

## 2018-11-19 NOTE — PROCEDURES
R bicep tendonTendon Sheath  Date/Time: 11/19/2018 4:17 PM  Performed by: Genesis Nelson MD  Authorized by: Genesis Nelson MD     Consent Done?: Yes (Verbal)  Timeout: prior to procedure the correct patient, procedure, and site was verified    Indications:  Pain  Site marked: the procedure site was marked    Timeout: prior to procedure the correct patient, procedure, and site was verified    Location:  Shoulder  Prep: patient was prepped and draped in usual sterile fashion    Needle size:  22 G  Medications:  40 mg methylPREDNISolone acetate 80 mg/mL  Patient tolerance:  Patient tolerated the procedure well with no immediate complications

## 2018-12-10 ENCOUNTER — CLINICAL SUPPORT (OUTPATIENT)
Dept: REHABILITATION | Facility: HOSPITAL | Age: 23
End: 2018-12-10
Attending: ORTHOPAEDIC SURGERY
Payer: COMMERCIAL

## 2018-12-10 DIAGNOSIS — G89.29 CHRONIC RIGHT SHOULDER PAIN: ICD-10-CM

## 2018-12-10 DIAGNOSIS — M25.60 DECREASED RANGE OF MOTION: ICD-10-CM

## 2018-12-10 DIAGNOSIS — M25.511 CHRONIC RIGHT SHOULDER PAIN: ICD-10-CM

## 2018-12-10 DIAGNOSIS — M62.81 MUSCLE WEAKNESS OF RIGHT UPPER EXTREMITY: ICD-10-CM

## 2018-12-10 PROCEDURE — 97530 THERAPEUTIC ACTIVITIES: CPT | Performed by: PHYSICAL THERAPIST

## 2018-12-10 PROCEDURE — 97161 PT EVAL LOW COMPLEX 20 MIN: CPT | Performed by: PHYSICAL THERAPIST

## 2018-12-10 NOTE — PLAN OF CARE
"[facility]  Physical Therapy Initial Evaluation    Name: Chago Douglas  Clinic Number: [id number]    Therapy Diagnosis:   Encounter Diagnoses   Name Primary?    Decreased range of motion     Muscle weakness of right upper extremity     Chronic right shoulder pain      Physician: Genesis Nelson MD    Physician Orders: PT Eval and Treat   Medical Diagnosis: M25.511 (ICD-10-CM) - Acute pain of right shoulder  Evaluation Date: 12/10/2018  Authorization Period Expiration: 12/31/2018  Plan of Care Certification Period 12/10/2018  Visit # / Visits authorized: 1 / 25    Time In: 13:30  Time Out: 14:30  Total Billable Time: 60 minutes    Precautions: Standard    Subjective   Date of onset: Summer 2018  History of current condition - Chago reports that he had an outing pitching, did not have symptoms during or after, but the following day, attempted to throw and had pain in R shoulder that never resolved leading him to see MD and referral to PT, pt attended 5 previous PT visits and now returns for further care    No past medical history on file.  Chago Douglas  has no past surgical history on file.    Chago has a current medication list which includes the following prescription(s): azithromycin, dextroamphetamine-amphetamine, neomycin-polymyxin-dexamethasone, ondansetron, and oxycodone-acetaminophen.    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   Prior Level of Function: I  Current Level of Function: pain with throwing a baseball     Pts goals: "get back to healthy enough to " "throw"     Objective     Observation:  Unremarkable in R shoulder     Range of Motion:   AROM Right Left Comment   Shoulder Elevatiom: 170 degrees 170 degrees    PROM Right Left Comment   Shoulder Flexion: 180 degrees 180 degrees    Shoulder Abduction: 180 POS degrees 180 POS degrees    Shoulder ER, 90°ABD: 131 degrees 125 degrees    Shoulder IR, 90° ABD: 52 degrees 64 degrees      FIR -2"   Strength:    Right Left Comment   Shoulder flexion: 5/5 5/5    Shoulder Abduction: 5/5 5/5    Shoulder ER: 5/5 5/5    Shoulder IR: 5/5 5/5      Scapular Control/Dyskinesis: negative     Special Tests: (-) Puri, neg Kingfisher's, equivocal anterior/posterior drawer     Palpation: (-) TTP t/o R shoulder     TREATMENT     Treatment Time In:  14:00  Treatment Time Out: 14:30  Total Treatment time separate from Evaluation time:30'      Chago participated in dynamic functional therapeutic activities to improve functional performance for 39  minutes, including:    TB iso walk out ER green 3x5  TB iso walk out IR blue 3x5  bblade up 3xburn   Soft toss + video biomechanical instruction     Education     Home Exercises Provided and Patient Education Provided     Education provided:   - fine line between stability and mobility in thrower's shoulder     Written Home Exercises Provided: yes.  Exercises were reviewed and Chago was able to demonstrate them prior to the end of the session.  Chago demonstrated good  understanding of the education provided.     See EMR under hand out for exercises provided 12/10/2018.      Assessment   Chago is a 23 y.o. male referred to outpatient Physical Therapy with a medical diagnosis of chronic R shoulder pain . Pt presents with     Pain  Decreased functional status     Pt prognosis is Good.   Pt will benefit from skilled outpatient Physical Therapy to address the deficits stated above and in the chart below, provide pt/family education, and to maximize pt's level of independence.     Plan of care discussed " with patient: Yes  Pt's spiritual, cultural and educational needs considered and pt agreeable to plan of care and goals as stated below:     Anticipated Barriers for therapy:  None     Medical Necessity is demonstrated by the following  History  Co-morbidities and personal factors that may impact the plan of care Co-morbidities:   none    Personal Factors:   no deficits     low   Examination  Body Structures and Functions, activity limitations and participation restrictions that may impact the plan of care Body Regions:   upper extremities    Body Systems:    motor control    Participation Restrictions:   Baseball     Activity limitations:   Mobility  no deficits    Self care  no deficits    Domestic Life  no deficits    Community and Social Life  recreation and leisure         low   Clinical Presentation stable and uncomplicated low   Decision Making/ Complexity Score: low     Goals     ST visit   !. I in HEP    Plan   Certification Period: 12/10/2018.    Outpatient Physical Therapy x 1 visit,  to include the following interventions: patient education, Therapeutic Activites and Therapeutic Exercise including ITP long toss and mound programs  Pt was able to make throwing corrections and would like to attempt to complete ITP at this time and following corrections, had no pain in R shoulder, if sxs reoccur, pt will contact PT and return for formal care .     Benjamin Moe, PT

## 2019-02-08 ENCOUNTER — HOSPITAL ENCOUNTER (OUTPATIENT)
Dept: RADIOLOGY | Facility: HOSPITAL | Age: 24
Discharge: HOME | End: 2019-02-08
Attending: ORTHOPAEDIC SURGERY
Payer: COMMERCIAL

## 2019-02-08 ENCOUNTER — OFFICE VISIT (OUTPATIENT)
Dept: SPORTS MEDICINE | Facility: CLINIC | Age: 24
End: 2019-02-08
Payer: COMMERCIAL

## 2019-02-08 VITALS
HEART RATE: 80 BPM | DIASTOLIC BLOOD PRESSURE: 88 MMHG | SYSTOLIC BLOOD PRESSURE: 147 MMHG | HEIGHT: 71 IN | BODY MASS INDEX: 22.4 KG/M2 | WEIGHT: 160 LBS

## 2019-02-08 DIAGNOSIS — M75.41 INTERNAL IMPINGEMENT OF RIGHT SHOULDER: ICD-10-CM

## 2019-02-08 DIAGNOSIS — G89.29 CHRONIC RIGHT SHOULDER PAIN: Primary | ICD-10-CM

## 2019-02-08 DIAGNOSIS — M25.511 RIGHT SHOULDER PAIN, UNSPECIFIED CHRONICITY: ICD-10-CM

## 2019-02-08 DIAGNOSIS — M75.21 BICEPS TENDONITIS ON RIGHT: ICD-10-CM

## 2019-02-08 DIAGNOSIS — M25.511 CHRONIC RIGHT SHOULDER PAIN: Primary | ICD-10-CM

## 2019-02-08 DIAGNOSIS — M25.811 TIGHT POSTERIOR CAPSULE OF RIGHT SHOULDER: ICD-10-CM

## 2019-02-08 PROCEDURE — 73030 XR SHOULDER COMPLETE 2 OR MORE VIEWS RIGHT: ICD-10-PCS | Mod: 26,RT,, | Performed by: RADIOLOGY

## 2019-02-08 PROCEDURE — 73030 X-RAY EXAM OF SHOULDER: CPT | Mod: 26,RT,, | Performed by: RADIOLOGY

## 2019-02-08 PROCEDURE — 99214 OFFICE O/P EST MOD 30 MIN: CPT | Mod: S$GLB,,, | Performed by: ORTHOPAEDIC SURGERY

## 2019-02-08 PROCEDURE — 3008F PR BODY MASS INDEX (BMI) DOCUMENTED: ICD-10-PCS | Mod: CPTII,S$GLB,, | Performed by: ORTHOPAEDIC SURGERY

## 2019-02-08 PROCEDURE — 73030 X-RAY EXAM OF SHOULDER: CPT | Mod: TC,FY,PO,RT

## 2019-02-08 PROCEDURE — 3008F BODY MASS INDEX DOCD: CPT | Mod: CPTII,S$GLB,, | Performed by: ORTHOPAEDIC SURGERY

## 2019-02-08 PROCEDURE — 99999 PR PBB SHADOW E&M-EST. PATIENT-LVL III: ICD-10-PCS | Mod: PBBFAC,,, | Performed by: ORTHOPAEDIC SURGERY

## 2019-02-08 PROCEDURE — 99214 PR OFFICE/OUTPT VISIT, EST, LEVL IV, 30-39 MIN: ICD-10-PCS | Mod: S$GLB,,, | Performed by: ORTHOPAEDIC SURGERY

## 2019-02-08 PROCEDURE — 99999 PR PBB SHADOW E&M-EST. PATIENT-LVL III: CPT | Mod: PBBFAC,,, | Performed by: ORTHOPAEDIC SURGERY

## 2019-02-08 NOTE — PROGRESS NOTES
CC: right Shoulder pain, [employer] [facility] [employer] studying sports management, RHD    23 y.o. Male reports that the pain is severe and not responding to any conservative care.      Pain began in June 2018  He notes that he hadn't thrown for a while and then threw for summer ball and his shoulder was painful   No injury or trauma   Patient was a right handed pitcher    He notes that he no longer plays baseball due to the pain, his father notes that this is not by the patient's choice but by staff    Right bicep tendon injection 11/19/2018 with Dr. Nelson, patient notes 50% relief  Patient notes that he did one visit with Benjamin oMe and was provided a throwing program, he notes that he completed the throwing program and also worked with the athletic trainers at school      He notes that he has had no pain since discontinuing throwing but that he continued to have pain with throwing following the injection and physical therapy       PAST MEDICAL HISTORY: History reviewed. No pertinent past medical history.  PAST SURGICAL HISTORY: History reviewed. No pertinent surgical history.  FAMILY HISTORY: History reviewed. No pertinent family history.  SOCIAL HISTORY:   Social History     Socioeconomic History    Marital status: Single     Spouse name: Not on file    Number of children: Not on file    Years of education: Not on file    Highest education level: Not on file   Social Needs    Financial resource strain: Not on file    Food insecurity - worry: Not on file    Food insecurity - inability: Not on file    Transportation needs - medical: Not on file    Transportation needs - non-medical: Not on file   Occupational History    Not on file   Tobacco Use    Smoking status: Never Smoker    Smokeless tobacco: Never Used   Substance and Sexual Activity    Alcohol use: No    Drug use: No    Sexual activity: No   Other Topics Concern    Not on file   Social History Narrative    Not on file       MEDICATIONS:   Current  "Outpatient Medications:     azithromycin (Z-RUKHSANA) 250 MG tablet, 2 TABLETS THE FIRST DAY THEN ONE TABLET DAILY UNTIL FINISHED THANK YOU, Disp: , Rfl: 0    dextroamphetamine-amphetamine (ADDERALL XR) 15 MG 24 hr capsule, Take 1 capsule (15 mg total) by mouth once daily., Disp: 30 capsule, Rfl: 0    neomycin-polymyxin-dexamethasone (MAXITROL) 3.5mg/mL-10,000 unit/mL-0.1 % DrpS, INSTILL 1 DROP IN OD Q 2 H, Disp: , Rfl: 1    ondansetron (ZOFRAN-ODT) 4 MG TbDL, TAKE ONE TABLET UNDER THE TONGUE EVERY FOUR TO SIX HOURS AS NEEDED NAUSEA / VOMITING, Disp: , Rfl: 0    oxycodone-acetaminophen (PERCOCET) 7.5-325 mg per tablet, TAKE ONE Tablet BY MOUTH EVERY 4-6 HOURS AS NEEDED FOR PAIN MAY CAUSE DROWSINESS THANK YOU, Disp: , Rfl: 0  ALLERGIES:   Review of patient's allergies indicates:   Allergen Reactions    Augmentin [amoxicillin-pot clavulanate]        VITAL SIGNS: BP (!) 147/88   Pulse 80   Ht 5' 11" (1.803 m)   Wt 72.6 kg (160 lb)   BMI 22.32 kg/m²      Review of Systems   Constitution: Negative. Negative for chills, fever and night sweats.   HENT: Negative for congestion and headaches.    Eyes: Negative for blurred vision, left vision loss and right vision loss.   Cardiovascular: Negative for chest pain and syncope.   Respiratory: Negative for cough and shortness of breath.    Endocrine: Negative for polydipsia, polyphagia and polyuria.   Hematologic/Lymphatic: Negative for bleeding problem. Does not bruise/bleed easily.   Skin: Negative for dry skin, itching and rash.   Musculoskeletal: Negative for falls and muscle weakness.   Gastrointestinal: Negative for abdominal pain and bowel incontinence.   Genitourinary: Negative for bladder incontinence and nocturia.   Neurological: Negative for disturbances in coordination, loss of balance and seizures.   Psychiatric/Behavioral: Negative for depression. The patient does not have insomnia.    Allergic/Immunologic: Negative for hives and persistent infections. "       PHYSICAL EXAMINATION:  General:  The patient is alert and oriented x 3.  Mood is pleasant.  Observation of ears, eyes and nose reveal no gross abnormalities.  HEENT: NCAT, sclera nonicteric  Lungs: Respirations are equal and unlabored.  Gait is coordinated. Patient can toe walk and heel walk without difficulty.  Cardiovascular: There are no swelling or varicosities present.   Lymphatic: Negative for adenopathy       right Shoulder / Upper Extremity Exam    OBSERVATION:     Swelling  none  Deformity  none   Discoloration  none   Scapular winging none   Scars   none  Atrophy  none    TENDERNESS / CREPITUS (T/C):          T/C      T/C   Clavicle   -/-  SUPRAspinatus    -/-   AC Jt.    -/-  INFRAspinatus  -/-   SC Jt.    -/-  Deltoid    -/-   G. Tuberosity  -/-  LH BICEP groove  +/-   Acromion:  -/-  Midline Neck   -/-   Scapular Spine -/-  Trapezium   -/-   SMA Scapula  -/-  GH jt. line - post  -/-   Scapulothoracic  -/-         ROM: (* = with pain)  Right shoulder   Left shoulder        AROM (PROM)   AROM (PROM)   FE    170° (175°)     170° (175°)     ER at 0°    70°  (65°)    70°  (65°)   ER at 90° ABD  90°  (90°)    90°  (90°)   IR at 90°  ABD   NA  (40°)     NA  (40°)      IR (spine level)   T10     T10    STRENGTH: (* = with pain) RIGHT SHOULDER  LEFT SHOULDER   SCAPTION   5/5    5/5    IR    5/5    5/5   ER    5/5    5/5   BICEPS   5/5    5/5   Deltoid    5/5    5/5     SIGNS:  Painful side       NEER   neg    OMASONS  +   FARFAN   neg    SPEEDS  Neg   DROP ARM   neg   BELLY PRESS Neg   Superior escape none    LIFT-OFF  Neg   X-Body ADD    neg    MOVING VALGUS +      STABILITY TESTING    RIGHT SHOULDER   LEFT SHOULDER       Translation    Anterior  up face     up face    Posterior  up face    up face    Sulcus   < 10mm    < 10 mm    Signs    Apprehension   neg      Neg    Relocation   no change     no change    Jerk test  neg     Neg      EXTREMITY NEURO-VASCULAR EXAM    Sensation grossly intact to  light touch all dermatomal regions.    DTR 2+ Biceps, Triceps, BR and Negative Felicias sign   Grossly intact motor function at Elbow, Wrist and Hand   Distal pulses radial and ulnar 2+, brisk cap refill, symmetric.      NECK:  Painless FROM and spinous processes non-tender. Negative Spurlings sign.      OTHER FINDINGS:  Side lying internal rotation: Right: 40, Left: 60    XRAYS:  Shoulder trauma series right,  were ordered and reviewed by me. No convincing fracture or dislocation is noted. The osseous structures appear well mineralized and well aligned    MRI: external from earlier last year   showed posterior labral fraying. Posterior rotator cuff fraying (consistent with internal impingement) and biceps tendinitis.       ASSESSMENT:  1. Shoulder internal impingement    2. Biceps tendonitis     Plan:       1. We will order a new MRI with arthrogram to assess for internal impingement   2. Surgical and non-surgical intervention were discussed today   3. Based off of the MRI results we will likely proceed with surgical intervention   4. All questions were answered, pt will contact us for questions or concerns in the interim.

## 2019-02-12 ENCOUNTER — TELEPHONE (OUTPATIENT)
Dept: RADIOLOGY | Facility: HOSPITAL | Age: 24
End: 2019-02-12

## 2019-02-13 ENCOUNTER — HOSPITAL ENCOUNTER (OUTPATIENT)
Dept: RADIOLOGY | Facility: HOSPITAL | Age: 24
Discharge: HOME | End: 2019-02-13
Attending: ORTHOPAEDIC SURGERY
Payer: COMMERCIAL

## 2019-02-13 DIAGNOSIS — M25.511 CHRONIC RIGHT SHOULDER PAIN: ICD-10-CM

## 2019-02-13 DIAGNOSIS — M75.41 INTERNAL IMPINGEMENT OF RIGHT SHOULDER: ICD-10-CM

## 2019-02-13 DIAGNOSIS — M25.811 TIGHT POSTERIOR CAPSULE OF RIGHT SHOULDER: ICD-10-CM

## 2019-02-13 DIAGNOSIS — G89.29 CHRONIC RIGHT SHOULDER PAIN: ICD-10-CM

## 2019-02-13 DIAGNOSIS — M75.21 BICEPS TENDONITIS ON RIGHT: ICD-10-CM

## 2019-02-13 PROCEDURE — 25500020 PHARM REV CODE 255: Performed by: ORTHOPAEDIC SURGERY

## 2019-02-13 PROCEDURE — 73040 CONTRAST X-RAY OF SHOULDER: CPT | Mod: 26,RT,, | Performed by: RADIOLOGY

## 2019-02-13 PROCEDURE — 73040 CONTRAST X-RAY OF SHOULDER: CPT | Mod: TC,RT

## 2019-02-13 PROCEDURE — 23350 XR ARTHROGRAM SHOULDER RIGHT WITH MRI TO FOLLOW: ICD-10-PCS | Mod: RT,,, | Performed by: RADIOLOGY

## 2019-02-13 PROCEDURE — 73222 MRI JOINT UPR EXTREM W/DYE: CPT | Mod: TC,RT

## 2019-02-13 PROCEDURE — 73222 MRI JOINT UPR EXTREM W/DYE: CPT | Mod: 26,RT,, | Performed by: RADIOLOGY

## 2019-02-13 PROCEDURE — A9585 GADOBUTROL INJECTION: HCPCS | Performed by: ORTHOPAEDIC SURGERY

## 2019-02-13 PROCEDURE — 73040 XR ARTHROGRAM SHOULDER RIGHT WITH MRI TO FOLLOW: ICD-10-PCS | Mod: 26,RT,, | Performed by: RADIOLOGY

## 2019-02-13 PROCEDURE — 73222 MRI ARTHROGRAM SHOULDER WITH CONTRAST RIGHT: ICD-10-PCS | Mod: 26,RT,, | Performed by: RADIOLOGY

## 2019-02-13 PROCEDURE — 23350 INJECTION FOR SHOULDER X-RAY: CPT | Mod: RT,,, | Performed by: RADIOLOGY

## 2019-02-13 PROCEDURE — 25000003 PHARM REV CODE 250: Performed by: ORTHOPAEDIC SURGERY

## 2019-02-13 RX ORDER — GADOBUTROL 604.72 MG/ML
12 INJECTION INTRAVENOUS
Status: COMPLETED | OUTPATIENT
Start: 2019-02-13 | End: 2019-02-13

## 2019-02-13 RX ORDER — LIDOCAINE HYDROCHLORIDE 10 MG/ML
8 INJECTION INFILTRATION; PERINEURAL ONCE
Status: COMPLETED | OUTPATIENT
Start: 2019-02-13 | End: 2019-02-13

## 2019-02-13 RX ADMIN — IOHEXOL 15 ML: 300 INJECTION, SOLUTION INTRAVENOUS at 01:02

## 2019-02-13 RX ADMIN — GADOBUTROL 12 ML: 604.72 INJECTION INTRAVENOUS at 01:02

## 2019-02-13 RX ADMIN — LIDOCAINE HYDROCHLORIDE 8 ML: 10 INJECTION, SOLUTION INFILTRATION; PERINEURAL at 01:02

## 2019-02-18 ENCOUNTER — DOCUMENTATION ONLY (OUTPATIENT)
Dept: SPORTS MEDICINE | Facility: CLINIC | Age: 24
End: 2019-02-18

## 2019-02-19 NOTE — PROGRESS NOTES
MRI reviewed personally by me:    MRI ARTHROGRAM SHOULDER WITH CONTRAST RIGHT    CLINICAL HISTORY:  Assess internal impingement, biceps, labrum;  Pain in right shoulder    TECHNIQUE:  MRI right shoulder performed without contrast per routine protocol.    COMPARISON:  Right shoulder radiograph dated 02/08/2019    FINDINGS:  Rotator cuff: Supraspinatus is intact.  There is undersurface fraying of the infraspinatus near the critical zone.  The teres minor and subscapularis are intact.  Muscle bulk is maintained.    Labrum: There is minimal fraying of the posterosuperior labrum, without discrete tear.  No para labral cyst.    Biceps: Long head biceps tendon is intact.    Bone: There is mild contour irregularity and cortical cystic change of the posterosuperior humeral head.  Otherwise no evidence for fracture or abnormal marrow signal replacement.    Acromioclavicular joint: The AC joint is unremarkable.    Cartilage: Articular cartilage of the glenohumeral joint is maintained.      Impression       Articular surface fraying of the posterior infraspinatus tendon with subcortical cystic change, which can be seen with internal impingement.  No discrete labral tear identified.    Electronically signed by resident: Michel Bennett  Date: 02/13/2019  Time: 15:37    Electronically signed by: Rohit Miranda MD  Date: 02/13/2019  Time: 16:47                              PLAN:   All questions were answered, pt will contact us for questions or concerns in the interim.  Had thorough discussion of non-operative vs operative intervention, and risks and benefits of both.     We have discussed the surgery and recovery of arthroscopic shoulder surgery. he understands that there may be limited mobility up to several weeks after surgery depending on procedures that are performed at the time of surgery.    The spectrum of treatment options were discussed with the patient, including nonoperative and operative options.  After thorough  discussion, the patient has elected to undergo surgical treatment to include:    right   a. Shoulder arthroscopic rotator cuff debridement (internal impingement) vs possible repair   b. Shoulder arthroscopic SAD   c. Shoulder arthroscopic extensive debridement (possible labral internal impingement debridement)   d. Shoulder arthroscopic vs. subpect tenodesis   e. Shoulder arthroscopic-assisted Bio-D arthrocentesis   f. Shoulder arthroscopic possible microfracture   g. Shoulder arthroscopic possible lysis of adhesions     The details of the surgical procedure were explained, including the location of probable incisions and a description of likely hardware and/or grafts to be used.  The patient understands the likely convalescence after surgery.  Also, we have thoroughly discussed the risks, benefits and alternatives to surgery, including, but not limited to, the risk of infection, joint stiffness, blood clot (including DVT and/or pulmonary embolus), neurologic and vascular injury.  It was explained that, if tissue has been repaired or reconstructed, there is a chance of failure, which may require further management.  Consent form for surgery is signed and in chart.    These risks include but are not limited to: bleeding, infection, scarring, re-tear of repair, irreparability of the tear, damage to neurovascular structures, damage to cartilage, stiffness, blood clots, pulmonary embolism, swelling, compartment syndrome, need for further surgery, and the risks of anesthesia. She verbalized her understanding of these risks and wished to proceed with surgery.   Time was spent face-to-face with the patient during this encounter on counseling about treatment options including surgery and coordination of her care for preoperative visits, surgery and post-operative rehab.

## 2019-02-22 DIAGNOSIS — M75.21 BICEPS TENDINITIS OF RIGHT UPPER EXTREMITY: ICD-10-CM

## 2019-02-22 DIAGNOSIS — M75.41 IMPINGEMENT SYNDROME OF RIGHT SHOULDER: Primary | ICD-10-CM

## 2019-02-22 DIAGNOSIS — M75.101 ROTATOR CUFF SYNDROME OF RIGHT SHOULDER: ICD-10-CM

## 2019-03-01 ENCOUNTER — OFFICE VISIT (OUTPATIENT)
Dept: SPORTS MEDICINE | Facility: CLINIC | Age: 24
End: 2019-03-01
Payer: COMMERCIAL

## 2019-03-01 ENCOUNTER — ANESTHESIA EVENT (OUTPATIENT)
Dept: SURGERY | Facility: OTHER | Age: 24
End: 2019-03-01
Payer: COMMERCIAL

## 2019-03-01 ENCOUNTER — HOSPITAL ENCOUNTER (OUTPATIENT)
Dept: PREADMISSION TESTING | Facility: OTHER | Age: 24
Discharge: HOME | End: 2019-03-01
Attending: ORTHOPAEDIC SURGERY
Payer: COMMERCIAL

## 2019-03-01 VITALS
HEART RATE: 74 BPM | SYSTOLIC BLOOD PRESSURE: 130 MMHG | DIASTOLIC BLOOD PRESSURE: 83 MMHG | WEIGHT: 160 LBS | BODY MASS INDEX: 22.4 KG/M2 | HEIGHT: 71 IN

## 2019-03-01 VITALS
SYSTOLIC BLOOD PRESSURE: 127 MMHG | HEART RATE: 78 BPM | BODY MASS INDEX: 23.1 KG/M2 | OXYGEN SATURATION: 97 % | HEIGHT: 71 IN | WEIGHT: 165 LBS | TEMPERATURE: 98 F | DIASTOLIC BLOOD PRESSURE: 78 MMHG

## 2019-03-01 DIAGNOSIS — M75.41 IMPINGEMENT SYNDROME OF RIGHT SHOULDER: Primary | ICD-10-CM

## 2019-03-01 DIAGNOSIS — M75.21 BICEPS TENDINITIS OF RIGHT UPPER EXTREMITY: ICD-10-CM

## 2019-03-01 DIAGNOSIS — G89.29 CHRONIC RIGHT SHOULDER PAIN: ICD-10-CM

## 2019-03-01 DIAGNOSIS — G89.18 POST-OPERATIVE PAIN: ICD-10-CM

## 2019-03-01 DIAGNOSIS — M75.101 ROTATOR CUFF SYNDROME OF RIGHT SHOULDER: ICD-10-CM

## 2019-03-01 DIAGNOSIS — M25.511 CHRONIC RIGHT SHOULDER PAIN: ICD-10-CM

## 2019-03-01 PROCEDURE — 99499 UNLISTED E&M SERVICE: CPT | Mod: S$GLB,,, | Performed by: PHYSICIAN ASSISTANT

## 2019-03-01 PROCEDURE — 99999 PR PBB SHADOW E&M-EST. PATIENT-LVL III: ICD-10-PCS | Mod: PBBFAC,,, | Performed by: PHYSICIAN ASSISTANT

## 2019-03-01 PROCEDURE — 99999 PR PBB SHADOW E&M-EST. PATIENT-LVL III: CPT | Mod: PBBFAC,,, | Performed by: PHYSICIAN ASSISTANT

## 2019-03-01 PROCEDURE — 99499 NO LOS: ICD-10-PCS | Mod: S$GLB,,, | Performed by: PHYSICIAN ASSISTANT

## 2019-03-01 RX ORDER — OXYCODONE AND ACETAMINOPHEN 7.5; 325 MG/1; MG/1
1 TABLET ORAL
Qty: 21 TABLET | Refills: 0 | Status: SHIPPED | OUTPATIENT
Start: 2019-03-01

## 2019-03-01 RX ORDER — PROMETHAZINE HYDROCHLORIDE 25 MG/1
25 TABLET ORAL EVERY 6 HOURS PRN
Qty: 16 TABLET | Refills: 0 | Status: SHIPPED | OUTPATIENT
Start: 2019-03-01

## 2019-03-01 RX ORDER — SODIUM CHLORIDE, SODIUM LACTATE, POTASSIUM CHLORIDE, CALCIUM CHLORIDE 600; 310; 30; 20 MG/100ML; MG/100ML; MG/100ML; MG/100ML
INJECTION, SOLUTION INTRAVENOUS CONTINUOUS
Status: CANCELLED | OUTPATIENT
Start: 2019-03-01

## 2019-03-01 RX ORDER — ACETAMINOPHEN 500 MG
1000 TABLET ORAL
Status: CANCELLED | OUTPATIENT
Start: 2019-03-01 | End: 2019-03-01

## 2019-03-01 RX ORDER — SODIUM CHLORIDE 9 MG/ML
INJECTION, SOLUTION INTRAVENOUS CONTINUOUS
Status: CANCELLED | OUTPATIENT
Start: 2019-03-01

## 2019-03-01 RX ORDER — TRAMADOL HYDROCHLORIDE 50 MG/1
50-100 TABLET ORAL EVERY 6 HOURS PRN
Qty: 28 TABLET | Refills: 0 | Status: SHIPPED | OUTPATIENT
Start: 2019-03-01

## 2019-03-01 RX ORDER — PREGABALIN 75 MG/1
150 CAPSULE ORAL
Status: CANCELLED | OUTPATIENT
Start: 2019-03-01 | End: 2019-03-01

## 2019-03-01 RX ORDER — FAMOTIDINE 20 MG/1
20 TABLET, FILM COATED ORAL
Status: CANCELLED | OUTPATIENT
Start: 2019-03-01 | End: 2019-03-01

## 2019-03-01 RX ORDER — OXYCODONE HCL 10 MG/1
10 TABLET, FILM COATED, EXTENDED RELEASE ORAL
Status: CANCELLED | OUTPATIENT
Start: 2019-03-01 | End: 2019-03-01

## 2019-03-01 NOTE — ANESTHESIA PREPROCEDURE EVALUATION
03/01/2019  Chago Douglas is a 23 y.o., male.    Anesthesia Evaluation    I have reviewed the Patient Summary Reports.    I have reviewed the Nursing Notes.   I have reviewed the Medications.     Review of Systems  Anesthesia Hx:  No previous Anesthesia  Denies Family Hx of Anesthesia complications.   Denies Personal Hx of Anesthesia complications.   Social:  Non-Smoker    Hematology/Oncology:  Hematology Normal   Oncology Normal     Cardiovascular:  Cardiovascular Normal     Pulmonary:  Pulmonary Normal    Renal/:  Renal/ Normal     Hepatic/GI:  Hepatic/GI Normal    Musculoskeletal:  Musculoskeletal Normal    Neurological:  Neurology Normal    Endocrine:  Endocrine Normal        Physical Exam  General:  Well nourished    Airway/Jaw/Neck:  Airway Findings: Mouth Opening: Normal General Airway Assessment: Adult  Mallampati: I         Dental:  Dental Findings: In tact             Anesthesia Plan  Type of Anesthesia, risks & benefits discussed:  Anesthesia Type:  general  Patient's Preference:   Intra-op Monitoring Plan: standard ASA monitors  Intra-op Monitoring Plan Comments:   Post Op Pain Control Plan: multimodal analgesia  Post Op Pain Control Plan Comments:   Induction:   IV  Beta Blocker:         Informed Consent: Patient understands risks and agrees with Anesthesia plan.  Questions answered. Anesthesia consent signed with patient.  ASA Score: 1     Day of Surgery Review of History & Physical:    H&P update referred to the surgeon.         Ready For Surgery From Anesthesia Perspective.

## 2019-03-01 NOTE — DISCHARGE INSTRUCTIONS
PRE-ADMIT TESTING -  [phone]    [address]  [place] [facility]          Your surgery has been scheduled at [facility]. We are pleased to have the opportunity to serve you. For Further Information please call [phone].    On the day of surgery please report to the Information Desk on the 1st floor.    · CONTACT YOUR PHYSICIAN'S OFFICE THE DAY PRIOR TO YOUR SURGERY TO OBTAIN YOUR ARRIVAL TIME.     · The evening before surgery do not eat anything after 9 p.m. ( this includes hard candy, chewing gum and mints).  You may only have GATORADE, POWERADE AND WATER  from 9 p.m. until you leave your home.   DO NOT DRINK ANY LIQUIDS ON THE WAY TO THE HOSPITAL.      SPECIAL MEDICATION INSTRUCTIONS: TAKE medications checked off by the Anesthesiologist on your Medication List.    Angiogram Patients: Take medications as instructed by your physician, including aspirin.     Surgery Patients:    If you take ASPIRIN - Your PHYSICIAN/SURGEON will need to inform you IF/OR when you need to stop taking aspirin prior to your surgery.     Do Not take any medications containing IBUPROFEN.  Do Not Wear any make-up or dark nail polish   (especially eye make-up) to surgery. If you come to surgery with makeup on you will be required to remove the makeup or nail polish.    Do not shave your surgical area at least 5 days prior to your surgery. The surgical prep will be performed at the hospital according to Infection Control regulations.    Leave all valuables at home.   Do Not wear any jewelry or watches, including any metal in body piercings. Jewelry must be removed prior to coming to the hospital.  There is a possibility that rings that are unable to be removed may be cut off if they are on the surgical extremity.    Contact Lens must be removed before surgery. Either do not wear the contact lens or bring a case and solution for storage.  Please bring a container for eyeglasses or dentures as required.  Bring  any paperwork your physician has provided, such as consent forms,  history and physicals, doctor's orders, etc.   Bring comfortable clothes that are loose fitting to wear upon discharge. Take into consideration the type of surgery being performed.  Maintain your diet as advised per your physician the day prior to surgery.      Adequate rest the night before surgery is advised.   Park in the Parking lot behind the hospital or in the [facility] across the street from the parking lot. Parking is complimentary.  If you will be discharged the same day as your procedure, please arrange for a responsible adult to drive you home or to accompany you if traveling by taxi.   YOU WILL NOT BE PERMITTED TO DRIVE OR TO LEAVE THE HOSPITAL ALONE AFTER SURGERY.   It is strongly recommended that you arrange for someone to remain with you for the first 24 hrs following your surgery.       Thank you for your cooperation.  The Staff of [facility].        Bathing Instructions                                                                 Please shower the evening before and morning of your procedure with    ANTIBACTERIAL SOAP. ( DIAL, etc )  Concentrate on the surgical area   for at least 3 minutes and rinse completely. Dry off as usual.   Do not use any deodorant, powder, body lotions, perfume, after shave or    cologne.

## 2019-03-01 NOTE — H&P (VIEW-ONLY)
Chago Douglas  is here for a completion of his perioperative paperwork. he  Is scheduled to undergo     right              a. Shoulder arthroscopic rotator cuff debridement (internal impingement) vs possible repair              b. Shoulder arthroscopic SAD              c. Shoulder arthroscopic extensive debridement (possible labral internal impingement debridement)              d. Shoulder arthroscopic vs. subpect tenodesis              e. Shoulder arthroscopic-assisted Bio-D arthrocentesis              f. Shoulder arthroscopic possible microfracture              g. Shoulder arthroscopic possible lysis of adhesions on 3/7/19.      He is a healthy individual and does not need clearance for this procedure.     PAST MEDICAL HISTORY: History reviewed. No pertinent past medical history.  PAST SURGICAL HISTORY:   Past Surgical History:   Procedure Laterality Date    WISDOM TOOTH EXTRACTION       FAMILY HISTORY: History reviewed. No pertinent family history.  SOCIAL HISTORY:   Social History     Socioeconomic History    Marital status: Single     Spouse name: Not on file    Number of children: Not on file    Years of education: Not on file    Highest education level: Not on file   Social Needs    Financial resource strain: Not on file    Food insecurity - worry: Not on file    Food insecurity - inability: Not on file    Transportation needs - medical: Not on file    Transportation needs - non-medical: Not on file   Occupational History    Not on file   Tobacco Use    Smoking status: Never Smoker    Smokeless tobacco: Never Used   Substance and Sexual Activity    Alcohol use: Yes     Comment: 3-4 day/week    Drug use: No    Sexual activity: No   Other Topics Concern    Not on file   Social History Narrative    Not on file       MEDICATIONS:   Current Outpatient Medications:     dextroamphetamine-amphetamine (ADDERALL XR) 15 MG 24 hr capsule, Take 1 capsule (15 mg total) by mouth once daily., Disp: 30  "capsule, Rfl: 0    oxyCODONE-acetaminophen (PERCOCET) 7.5-325 mg per tablet, Take 1 tablet by mouth every 4 to 6 hours as needed for Pain., Disp: 21 tablet, Rfl: 0    promethazine (PHENERGAN) 25 MG tablet, Take 1 tablet (25 mg total) by mouth every 6 (six) hours as needed for Nausea., Disp: 16 tablet, Rfl: 0    traMADol (ULTRAM) 50 mg tablet, Take 1-2 tablets ( mg total) by mouth every 6 (six) hours as needed (surgical pain)., Disp: 28 tablet, Rfl: 0  ALLERGIES:   Review of patient's allergies indicates:   Allergen Reactions    Augmentin [amoxicillin-pot clavulanate] Other (See Comments)     As a baby - unknown       VITAL SIGNS: /83   Pulse 74   Ht 5' 11" (1.803 m)   Wt 72.6 kg (160 lb)   BMI 22.32 kg/m²      Risks, indications and benefits of the surgical procedure were discussed with the patient. All questions with regard to surgery, rehab, expected return to functional activities, activities of daily living and recreational endeavors were answered to his satisfaction.    It was explained to the patient that there may be an increase in surgical risks if the patient has certain co-morbidities such as but not limited to: Obesity, Cardiovascular issues (CHF, CAD, Arrhythmias), chronic pulmonary issues, previous or current neurovascular/neurological issues, previous strokes, diabetes mellitus, previous wound healing issues, previous wound or skin infections, PVD, clotting disorders, if the patient uses chronic steroids, if the patient takes or has immune compromising medications or diseases, or has previously or currently used tobacco products.     The patient verbalized that he/she does not have any additional clotting, bleeding, or blood disorders, other than what is list in her chart on today's review.     Then a brief history and physical exam were performed.    Review of Systems   Constitution: Negative. Negative for chills, fever and night sweats.   HENT: Negative for congestion and " headaches.    Eyes: Negative for blurred vision, left vision loss and right vision loss.   Cardiovascular: Negative for chest pain and syncope.   Respiratory: Negative for cough and shortness of breath.    Endocrine: Negative for polydipsia, polyphagia and polyuria.   Hematologic/Lymphatic: Negative for bleeding problem. Does not bruise/bleed easily.   Skin: Negative for dry skin, itching and rash.   Musculoskeletal: Negative for falls and muscle weakness.   Gastrointestinal: Negative for abdominal pain and bowel incontinence.   Genitourinary: Negative for bladder incontinence and nocturia.   Neurological: Negative for disturbances in coordination, loss of balance and seizures.   Psychiatric/Behavioral: Negative for depression. The patient does not have insomnia.    Allergic/Immunologic: Negative for hives and persistent infections.     PHYSICAL EXAM:  GEN: A&Ox3, WD WN NAD  HEENT: WNL  CHEST: CTAB, no W/R/R  HEART: RRR, no M/R/G  ABD: Soft, NT ND, BS x4 QUADS  MS; See Epic  NEURO: CN II-XII intact       The surgical consent was then reviewed with the patient, who agreed with all the contents of the consent form and it was signed. he was then given the [facility] surgery packet to bring with him to [facility] for the anesthesia portion of his perioperative paperwork.   For all physicians except for Dr. Latif, we will email and possibly fax the consent forms and booking sheets to [facility] pre-admit.    The patient was given the opportunity to ask questions about the surgical plan and consent form, and once no other questions were asked, I proceeded with the pre-op appointment.    PHYSICAL THERAPY:  He was also instructed regarding physical therapy and will begin on  TBD after surgery. He was given a copy of the original prescription to schedule. Another copy of this prescription was given to patient with attached rehab protocols to bring to [facility] PT after surgery.    POST OP CARE:instructions were reviewed  including care of the wound and dressing after surgery and when he can shower.     PAIN MANAGEMENT: Chago Douglas was also given his pain management regime, which includes the TENS unit given to him by marcus along with the education required for its use. He was also instructed regarding the Polar ice unit that will be in place after surgery and his postoperative pain medications.     PAIN MEDICATION:  Percocet 10/325mg 1 po q 4-6 hours prn pain  Ultram 50 mg Take 1-2 p.o. q.6 hours p.r.n. breakthrough pain,   Phenergan 25 mg one p.o. q.6 hours p.r.n. nausea and vomiting.    The patient will only require mechanical DVT prophylaxis with TEDs, SCDs, and early ambulation following surgery. Patient does NOT require aspirin for DVT prophylaxis.    This was discussed with the patient. The patient was questioned about their risk factors for developing DVTs including if there was any history of DVTs. The patient was asked if any specific recommendations were given from the doctor/s that did pre-operative surgical clearance. The patient was then given an education sheet about DVTs and PE with warning signs and symptoms of both and steps to take if they suspect either of these.     Patient denies history of seizures.     This along with the Modified Caprini risk assessment model for VTE in general surgical patients was used to determine the patient's DVT risk.     From: Katie TAYLOR, Epi DA, Willow SM, et al. Prevention of VTE in nonorthopedic surgical patients: antithrombotic therapy and prevention of thrombosis, 9th ed: American College of Chest Physicians evidence-based clinical practical guidelines. Chest 2012; 141:e227S. Copyright © 2012. Reproduced with permission from the American College of Chest Physicians.      The patient was told that narcotic pain medications may make them drowsy and instructions were given to not sign legal documents, drive or operate heavy machinery, cars, or equipment while under the influence of  narcotic medications. The patient was told and understands that narcotic pain medications should only be used as needed to control pain and that other options of pain control include TENs unit and ice packs/unit.     As there were no other questions to be asked, he was given my business card along with Genesis Nelson MD business card if he has any questions or concerns prior to surgery or in the postop period.

## 2019-03-01 NOTE — H&P
Chago Douglas  is here for a completion of his perioperative paperwork. he  Is scheduled to undergo     right              a. Shoulder arthroscopic rotator cuff debridement (internal impingement) vs possible repair              b. Shoulder arthroscopic SAD              c. Shoulder arthroscopic extensive debridement (possible labral internal impingement debridement)              d. Shoulder arthroscopic vs. subpect tenodesis              e. Shoulder arthroscopic-assisted Bio-D arthrocentesis              f. Shoulder arthroscopic possible microfracture              g. Shoulder arthroscopic possible lysis of adhesions on 3/7/19.      He is a healthy individual and does not need clearance for this procedure.     PAST MEDICAL HISTORY: History reviewed. No pertinent past medical history.  PAST SURGICAL HISTORY:   Past Surgical History:   Procedure Laterality Date    WISDOM TOOTH EXTRACTION       FAMILY HISTORY: History reviewed. No pertinent family history.  SOCIAL HISTORY:   Social History     Socioeconomic History    Marital status: Single     Spouse name: Not on file    Number of children: Not on file    Years of education: Not on file    Highest education level: Not on file   Social Needs    Financial resource strain: Not on file    Food insecurity - worry: Not on file    Food insecurity - inability: Not on file    Transportation needs - medical: Not on file    Transportation needs - non-medical: Not on file   Occupational History    Not on file   Tobacco Use    Smoking status: Never Smoker    Smokeless tobacco: Never Used   Substance and Sexual Activity    Alcohol use: Yes     Comment: 3-4 day/week    Drug use: No    Sexual activity: No   Other Topics Concern    Not on file   Social History Narrative    Not on file       MEDICATIONS:   Current Outpatient Medications:     dextroamphetamine-amphetamine (ADDERALL XR) 15 MG 24 hr capsule, Take 1 capsule (15 mg total) by mouth once daily., Disp: 30  "capsule, Rfl: 0    oxyCODONE-acetaminophen (PERCOCET) 7.5-325 mg per tablet, Take 1 tablet by mouth every 4 to 6 hours as needed for Pain., Disp: 21 tablet, Rfl: 0    promethazine (PHENERGAN) 25 MG tablet, Take 1 tablet (25 mg total) by mouth every 6 (six) hours as needed for Nausea., Disp: 16 tablet, Rfl: 0    traMADol (ULTRAM) 50 mg tablet, Take 1-2 tablets ( mg total) by mouth every 6 (six) hours as needed (surgical pain)., Disp: 28 tablet, Rfl: 0  ALLERGIES:   Review of patient's allergies indicates:   Allergen Reactions    Augmentin [amoxicillin-pot clavulanate] Other (See Comments)     As a baby - unknown       VITAL SIGNS: /83   Pulse 74   Ht 5' 11" (1.803 m)   Wt 72.6 kg (160 lb)   BMI 22.32 kg/m²      Risks, indications and benefits of the surgical procedure were discussed with the patient. All questions with regard to surgery, rehab, expected return to functional activities, activities of daily living and recreational endeavors were answered to his satisfaction.    It was explained to the patient that there may be an increase in surgical risks if the patient has certain co-morbidities such as but not limited to: Obesity, Cardiovascular issues (CHF, CAD, Arrhythmias), chronic pulmonary issues, previous or current neurovascular/neurological issues, previous strokes, diabetes mellitus, previous wound healing issues, previous wound or skin infections, PVD, clotting disorders, if the patient uses chronic steroids, if the patient takes or has immune compromising medications or diseases, or has previously or currently used tobacco products.     The patient verbalized that he/she does not have any additional clotting, bleeding, or blood disorders, other than what is list in her chart on today's review.     Then a brief history and physical exam were performed.    Review of Systems   Constitution: Negative. Negative for chills, fever and night sweats.   HENT: Negative for congestion and " headaches.    Eyes: Negative for blurred vision, left vision loss and right vision loss.   Cardiovascular: Negative for chest pain and syncope.   Respiratory: Negative for cough and shortness of breath.    Endocrine: Negative for polydipsia, polyphagia and polyuria.   Hematologic/Lymphatic: Negative for bleeding problem. Does not bruise/bleed easily.   Skin: Negative for dry skin, itching and rash.   Musculoskeletal: Negative for falls and muscle weakness.   Gastrointestinal: Negative for abdominal pain and bowel incontinence.   Genitourinary: Negative for bladder incontinence and nocturia.   Neurological: Negative for disturbances in coordination, loss of balance and seizures.   Psychiatric/Behavioral: Negative for depression. The patient does not have insomnia.    Allergic/Immunologic: Negative for hives and persistent infections.     PHYSICAL EXAM:  GEN: A&Ox3, WD WN NAD  HEENT: WNL  CHEST: CTAB, no W/R/R  HEART: RRR, no M/R/G  ABD: Soft, NT ND, BS x4 QUADS  MS; See Epic  NEURO: CN II-XII intact       The surgical consent was then reviewed with the patient, who agreed with all the contents of the consent form and it was signed. he was then given the [facility] surgery packet to bring with him to [facility] for the anesthesia portion of his perioperative paperwork.   For all physicians except for Dr. Latif, we will email and possibly fax the consent forms and booking sheets to [facility] pre-admit.    The patient was given the opportunity to ask questions about the surgical plan and consent form, and once no other questions were asked, I proceeded with the pre-op appointment.    PHYSICAL THERAPY:  He was also instructed regarding physical therapy and will begin on  TBD after surgery. He was given a copy of the original prescription to schedule. Another copy of this prescription was given to patient with attached rehab protocols to bring to [facility] PT after surgery.    POST OP CARE:instructions were reviewed  including care of the wound and dressing after surgery and when he can shower.     PAIN MANAGEMENT: Chago Douglas was also given his pain management regime, which includes the TENS unit given to him by marcus along with the education required for its use. He was also instructed regarding the Polar ice unit that will be in place after surgery and his postoperative pain medications.     PAIN MEDICATION:  Percocet 10/325mg 1 po q 4-6 hours prn pain  Ultram 50 mg Take 1-2 p.o. q.6 hours p.r.n. breakthrough pain,   Phenergan 25 mg one p.o. q.6 hours p.r.n. nausea and vomiting.    The patient will only require mechanical DVT prophylaxis with TEDs, SCDs, and early ambulation following surgery. Patient does NOT require aspirin for DVT prophylaxis.    This was discussed with the patient. The patient was questioned about their risk factors for developing DVTs including if there was any history of DVTs. The patient was asked if any specific recommendations were given from the doctor/s that did pre-operative surgical clearance. The patient was then given an education sheet about DVTs and PE with warning signs and symptoms of both and steps to take if they suspect either of these.     Patient denies history of seizures.     This along with the Modified Caprini risk assessment model for VTE in general surgical patients was used to determine the patient's DVT risk.     From: Katie TAYLOR, Epi DA, Willow SM, et al. Prevention of VTE in nonorthopedic surgical patients: antithrombotic therapy and prevention of thrombosis, 9th ed: American College of Chest Physicians evidence-based clinical practical guidelines. Chest 2012; 141:e227S. Copyright © 2012. Reproduced with permission from the American College of Chest Physicians.      The patient was told that narcotic pain medications may make them drowsy and instructions were given to not sign legal documents, drive or operate heavy machinery, cars, or equipment while under the influence of  narcotic medications. The patient was told and understands that narcotic pain medications should only be used as needed to control pain and that other options of pain control include TENs unit and ice packs/unit.     As there were no other questions to be asked, he was given my business card along with Genesis Nelson MD business card if he has any questions or concerns prior to surgery or in the postop period.

## 2019-03-07 ENCOUNTER — TELEPHONE (OUTPATIENT)
Dept: SPORTS MEDICINE | Facility: CLINIC | Age: 24
End: 2019-03-07

## 2019-03-07 ENCOUNTER — ANESTHESIA (OUTPATIENT)
Dept: SURGERY | Facility: OTHER | Age: 24
End: 2019-03-07
Payer: COMMERCIAL

## 2019-03-07 ENCOUNTER — HOSPITAL ENCOUNTER (OUTPATIENT)
Facility: OTHER | Age: 24
Discharge: HOME | End: 2019-03-07
Attending: ORTHOPAEDIC SURGERY | Admitting: ORTHOPAEDIC SURGERY
Payer: COMMERCIAL

## 2019-03-07 VITALS
TEMPERATURE: 98 F | WEIGHT: 164 LBS | BODY MASS INDEX: 22.96 KG/M2 | SYSTOLIC BLOOD PRESSURE: 136 MMHG | DIASTOLIC BLOOD PRESSURE: 87 MMHG | HEART RATE: 90 BPM | RESPIRATION RATE: 16 BRPM | HEIGHT: 71 IN | OXYGEN SATURATION: 97 %

## 2019-03-07 DIAGNOSIS — M25.511 CHRONIC RIGHT SHOULDER PAIN: ICD-10-CM

## 2019-03-07 DIAGNOSIS — G89.29 CHRONIC RIGHT SHOULDER PAIN: ICD-10-CM

## 2019-03-07 DIAGNOSIS — M75.41 IMPINGEMENT SYNDROME OF RIGHT SHOULDER: ICD-10-CM

## 2019-03-07 DIAGNOSIS — M75.101 ROTATOR CUFF SYNDROME OF RIGHT SHOULDER: ICD-10-CM

## 2019-03-07 DIAGNOSIS — M75.21 BICEPS TENDINITIS OF RIGHT UPPER EXTREMITY: ICD-10-CM

## 2019-03-07 DIAGNOSIS — M75.41 IMPINGEMENT SYNDROME OF RIGHT SHOULDER: Primary | ICD-10-CM

## 2019-03-07 PROCEDURE — 36000710: Performed by: ORTHOPAEDIC SURGERY

## 2019-03-07 PROCEDURE — 63600175 PHARM REV CODE 636 W HCPCS: Performed by: ORTHOPAEDIC SURGERY

## 2019-03-07 PROCEDURE — 71000015 HC POSTOP RECOV 1ST HR: Performed by: ORTHOPAEDIC SURGERY

## 2019-03-07 PROCEDURE — 37000008 HC ANESTHESIA 1ST 15 MINUTES: Performed by: ORTHOPAEDIC SURGERY

## 2019-03-07 PROCEDURE — 63600175 PHARM REV CODE 636 W HCPCS: Performed by: NURSE ANESTHETIST, CERTIFIED REGISTERED

## 2019-03-07 PROCEDURE — 27201423 OPTIME MED/SURG SUP & DEVICES STERILE SUPPLY: Performed by: ORTHOPAEDIC SURGERY

## 2019-03-07 PROCEDURE — 29823 PR SHLDR ARTHROSCOP,EXTEN DEBRIDE: ICD-10-PCS | Mod: RT,,, | Performed by: ORTHOPAEDIC SURGERY

## 2019-03-07 PROCEDURE — 25000003 PHARM REV CODE 250: Performed by: PHYSICIAN ASSISTANT

## 2019-03-07 PROCEDURE — S0077 INJECTION, CLINDAMYCIN PHOSP: HCPCS | Performed by: PHYSICIAN ASSISTANT

## 2019-03-07 PROCEDURE — 25000003 PHARM REV CODE 250: Performed by: ORTHOPAEDIC SURGERY

## 2019-03-07 PROCEDURE — 63600175 PHARM REV CODE 636 W HCPCS: Performed by: ANESTHESIOLOGY

## 2019-03-07 PROCEDURE — 25000003 PHARM REV CODE 250: Performed by: ANESTHESIOLOGY

## 2019-03-07 PROCEDURE — 29823 SHO ARTHRS SRG XTNSV DBRDMT: CPT | Mod: RT,,, | Performed by: ORTHOPAEDIC SURGERY

## 2019-03-07 PROCEDURE — 71000033 HC RECOVERY, INTIAL HOUR: Performed by: ORTHOPAEDIC SURGERY

## 2019-03-07 PROCEDURE — 71000016 HC POSTOP RECOV ADDL HR: Performed by: ORTHOPAEDIC SURGERY

## 2019-03-07 PROCEDURE — 37000009 HC ANESTHESIA EA ADD 15 MINS: Performed by: ORTHOPAEDIC SURGERY

## 2019-03-07 PROCEDURE — 25000003 PHARM REV CODE 250: Performed by: NURSE ANESTHETIST, CERTIFIED REGISTERED

## 2019-03-07 PROCEDURE — 36000711: Performed by: ORTHOPAEDIC SURGERY

## 2019-03-07 RX ORDER — MORPHINE SULFATE 10 MG/ML
3 INJECTION INTRAMUSCULAR; INTRAVENOUS; SUBCUTANEOUS EVERY 6 HOURS PRN
Status: CANCELLED | OUTPATIENT
Start: 2019-03-07

## 2019-03-07 RX ORDER — MIDAZOLAM HYDROCHLORIDE 1 MG/ML
INJECTION INTRAMUSCULAR; INTRAVENOUS
Status: DISCONTINUED | OUTPATIENT
Start: 2019-03-07 | End: 2019-03-07

## 2019-03-07 RX ORDER — PROPOFOL 10 MG/ML
VIAL (ML) INTRAVENOUS
Status: DISCONTINUED | OUTPATIENT
Start: 2019-03-07 | End: 2019-03-07

## 2019-03-07 RX ORDER — ONDANSETRON 2 MG/ML
INJECTION INTRAMUSCULAR; INTRAVENOUS
Status: DISCONTINUED | OUTPATIENT
Start: 2019-03-07 | End: 2019-03-07

## 2019-03-07 RX ORDER — OXYCODONE HYDROCHLORIDE 5 MG/1
5 TABLET ORAL
Status: DISCONTINUED | OUTPATIENT
Start: 2019-03-07 | End: 2019-03-07 | Stop reason: HOSPADM

## 2019-03-07 RX ORDER — FENTANYL CITRATE 50 UG/ML
INJECTION, SOLUTION INTRAMUSCULAR; INTRAVENOUS
Status: DISCONTINUED | OUTPATIENT
Start: 2019-03-07 | End: 2019-03-07

## 2019-03-07 RX ORDER — PREGABALIN 75 MG/1
150 CAPSULE ORAL
Status: COMPLETED | OUTPATIENT
Start: 2019-03-07 | End: 2019-03-07

## 2019-03-07 RX ORDER — NEOSTIGMINE METHYLSULFATE 1 MG/ML
INJECTION, SOLUTION INTRAVENOUS
Status: DISCONTINUED | OUTPATIENT
Start: 2019-03-07 | End: 2019-03-07

## 2019-03-07 RX ORDER — OXYCODONE HCL 10 MG/1
10 TABLET, FILM COATED, EXTENDED RELEASE ORAL
Status: COMPLETED | OUTPATIENT
Start: 2019-03-07 | End: 2019-03-07

## 2019-03-07 RX ORDER — OXYCODONE HYDROCHLORIDE 5 MG/1
10 TABLET ORAL EVERY 6 HOURS PRN
Status: CANCELLED | OUTPATIENT
Start: 2019-03-07

## 2019-03-07 RX ORDER — ROCURONIUM BROMIDE 10 MG/ML
INJECTION, SOLUTION INTRAVENOUS
Status: DISCONTINUED | OUTPATIENT
Start: 2019-03-07 | End: 2019-03-07

## 2019-03-07 RX ORDER — FENTANYL CITRATE 50 UG/ML
25 INJECTION, SOLUTION INTRAMUSCULAR; INTRAVENOUS EVERY 5 MIN PRN
Status: DISCONTINUED | OUTPATIENT
Start: 2019-03-07 | End: 2019-03-07 | Stop reason: HOSPADM

## 2019-03-07 RX ORDER — ACETAMINOPHEN 500 MG
1000 TABLET ORAL
Status: COMPLETED | OUTPATIENT
Start: 2019-03-07 | End: 2019-03-07

## 2019-03-07 RX ORDER — PHENYLEPHRINE HYDROCHLORIDE 10 MG/ML
INJECTION INTRAVENOUS
Status: DISCONTINUED | OUTPATIENT
Start: 2019-03-07 | End: 2019-03-07

## 2019-03-07 RX ORDER — KETAMINE HYDROCHLORIDE 50 MG/ML
INJECTION, SOLUTION INTRAMUSCULAR; INTRAVENOUS
Status: DISCONTINUED | OUTPATIENT
Start: 2019-03-07 | End: 2019-03-07 | Stop reason: HOSPADM

## 2019-03-07 RX ORDER — SODIUM CHLORIDE 9 MG/ML
INJECTION, SOLUTION INTRAVENOUS CONTINUOUS
Status: DISCONTINUED | OUTPATIENT
Start: 2019-03-07 | End: 2019-03-07 | Stop reason: HOSPADM

## 2019-03-07 RX ORDER — CLINDAMYCIN PHOSPHATE 900 MG/50ML
900 INJECTION, SOLUTION INTRAVENOUS
Status: COMPLETED | OUTPATIENT
Start: 2019-03-07 | End: 2019-03-07

## 2019-03-07 RX ORDER — DEXAMETHASONE SODIUM PHOSPHATE 4 MG/ML
INJECTION, SOLUTION INTRA-ARTICULAR; INTRALESIONAL; INTRAMUSCULAR; INTRAVENOUS; SOFT TISSUE
Status: DISCONTINUED | OUTPATIENT
Start: 2019-03-07 | End: 2019-03-07

## 2019-03-07 RX ORDER — DIPHENHYDRAMINE HYDROCHLORIDE 50 MG/ML
12.5 INJECTION INTRAMUSCULAR; INTRAVENOUS EVERY 30 MIN PRN
Status: DISCONTINUED | OUTPATIENT
Start: 2019-03-07 | End: 2019-03-07 | Stop reason: HOSPADM

## 2019-03-07 RX ORDER — SODIUM CHLORIDE 0.9 % (FLUSH) 0.9 %
3 SYRINGE (ML) INJECTION
Status: DISCONTINUED | OUTPATIENT
Start: 2019-03-07 | End: 2019-03-07 | Stop reason: HOSPADM

## 2019-03-07 RX ORDER — SODIUM CHLORIDE, SODIUM LACTATE, POTASSIUM CHLORIDE, CALCIUM CHLORIDE 600; 310; 30; 20 MG/100ML; MG/100ML; MG/100ML; MG/100ML
INJECTION, SOLUTION INTRAVENOUS CONTINUOUS
Status: DISCONTINUED | OUTPATIENT
Start: 2019-03-07 | End: 2019-03-07 | Stop reason: HOSPADM

## 2019-03-07 RX ORDER — ONDANSETRON 2 MG/ML
4 INJECTION INTRAMUSCULAR; INTRAVENOUS DAILY PRN
Status: DISCONTINUED | OUTPATIENT
Start: 2019-03-07 | End: 2019-03-07 | Stop reason: HOSPADM

## 2019-03-07 RX ORDER — HYDROMORPHONE HYDROCHLORIDE 2 MG/ML
0.4 INJECTION, SOLUTION INTRAMUSCULAR; INTRAVENOUS; SUBCUTANEOUS EVERY 5 MIN PRN
Status: DISCONTINUED | OUTPATIENT
Start: 2019-03-07 | End: 2019-03-07 | Stop reason: HOSPADM

## 2019-03-07 RX ORDER — MEPERIDINE HYDROCHLORIDE 25 MG/ML
12.5 INJECTION INTRAMUSCULAR; INTRAVENOUS; SUBCUTANEOUS ONCE AS NEEDED
Status: DISCONTINUED | OUTPATIENT
Start: 2019-03-07 | End: 2019-03-07 | Stop reason: HOSPADM

## 2019-03-07 RX ORDER — KETOROLAC TROMETHAMINE 30 MG/ML
INJECTION, SOLUTION INTRAMUSCULAR; INTRAVENOUS
Status: DISCONTINUED | OUTPATIENT
Start: 2019-03-07 | End: 2019-03-07 | Stop reason: HOSPADM

## 2019-03-07 RX ORDER — LIDOCAINE HCL/PF 100 MG/5ML
SYRINGE (ML) INTRAVENOUS
Status: DISCONTINUED | OUTPATIENT
Start: 2019-03-07 | End: 2019-03-07

## 2019-03-07 RX ORDER — ROPIVACAINE HYDROCHLORIDE 5 MG/ML
INJECTION, SOLUTION EPIDURAL; INFILTRATION; PERINEURAL
Status: DISCONTINUED | OUTPATIENT
Start: 2019-03-07 | End: 2019-03-07 | Stop reason: HOSPADM

## 2019-03-07 RX ORDER — GLYCOPYRROLATE 0.2 MG/ML
INJECTION INTRAMUSCULAR; INTRAVENOUS
Status: DISCONTINUED | OUTPATIENT
Start: 2019-03-07 | End: 2019-03-07

## 2019-03-07 RX ORDER — FAMOTIDINE 20 MG/1
20 TABLET, FILM COATED ORAL
Status: COMPLETED | OUTPATIENT
Start: 2019-03-07 | End: 2019-03-07

## 2019-03-07 RX ADMIN — GLYCOPYRROLATE 0.2 MG: 0.2 INJECTION, SOLUTION INTRAMUSCULAR; INTRAVENOUS at 08:03

## 2019-03-07 RX ADMIN — MIDAZOLAM HYDROCHLORIDE 2 MG: 1 INJECTION, SOLUTION INTRAMUSCULAR; INTRAVENOUS at 07:03

## 2019-03-07 RX ADMIN — NEOSTIGMINE METHYLSULFATE 5 MG: 1 INJECTION INTRAVENOUS at 09:03

## 2019-03-07 RX ADMIN — ACETAMINOPHEN 1000 MG: 500 TABLET ORAL at 07:03

## 2019-03-07 RX ADMIN — FAMOTIDINE 20 MG: 20 TABLET, FILM COATED ORAL at 07:03

## 2019-03-07 RX ADMIN — CLINDAMYCIN PHOSPHATE 900 MG: 18 INJECTION, SOLUTION INTRAVENOUS at 08:03

## 2019-03-07 RX ADMIN — OXYCODONE HYDROCHLORIDE 10 MG: 10 TABLET, FILM COATED, EXTENDED RELEASE ORAL at 07:03

## 2019-03-07 RX ADMIN — PREGABALIN 150 MG: 75 CAPSULE ORAL at 07:03

## 2019-03-07 RX ADMIN — FENTANYL CITRATE 100 MCG: 50 INJECTION, SOLUTION INTRAMUSCULAR; INTRAVENOUS at 08:03

## 2019-03-07 RX ADMIN — LIDOCAINE HYDROCHLORIDE 50 MG: 20 INJECTION, SOLUTION INTRAVENOUS at 08:03

## 2019-03-07 RX ADMIN — PHENYLEPHRINE HYDROCHLORIDE 100 MCG: 10 INJECTION INTRAVENOUS at 09:03

## 2019-03-07 RX ADMIN — GLYCOPYRROLATE 0.6 MG: 0.2 INJECTION, SOLUTION INTRAMUSCULAR; INTRAVENOUS at 09:03

## 2019-03-07 RX ADMIN — ROCURONIUM BROMIDE 40 MG: 10 INJECTION, SOLUTION INTRAVENOUS at 08:03

## 2019-03-07 RX ADMIN — PROPOFOL 40 MG: 10 INJECTION, EMULSION INTRAVENOUS at 09:03

## 2019-03-07 RX ADMIN — DEXAMETHASONE SODIUM PHOSPHATE 8 MG: 4 INJECTION, SOLUTION INTRAMUSCULAR; INTRAVENOUS at 08:03

## 2019-03-07 RX ADMIN — PROPOFOL 200 MG: 10 INJECTION, EMULSION INTRAVENOUS at 08:03

## 2019-03-07 RX ADMIN — SODIUM CHLORIDE, SODIUM LACTATE, POTASSIUM CHLORIDE, AND CALCIUM CHLORIDE: 600; 310; 30; 20 INJECTION, SOLUTION INTRAVENOUS at 07:03

## 2019-03-07 RX ADMIN — ONDANSETRON 4 MG: 2 INJECTION INTRAMUSCULAR; INTRAVENOUS at 09:03

## 2019-03-07 NOTE — DISCHARGE INSTRUCTIONS
Anesthesia: After Your Surgery  Youve just had surgery. During surgery, you received medication called anesthesia to keep you comfortable and pain-free. After surgery, you may experience some pain or nausea. This is common. Here are some tips for feeling better and recovering after surgery.    Going home  Your doctor or nurse will show you how to take care of yourself when you go home. He or she will also answer your questions. Have an adult family member or friend drive you home. For the first 24 hours after your surgery:  · Do not drive or use heavy equipment.  · Do not make important decisions or sign legal documents.  · Avoid alcohol.  · Have someone stay with you, if needed. He or she can watch for problems and help keep you safe.  Be sure to keep all follow-up appointments with your doctor. And rest after your procedure for as long as your doctor tells you to.    Coping with pain  If you have pain after surgery, pain medication will help you feel better. Take it as directed, before pain becomes severe. Also, ask your doctor or pharmacist about other ways to control pain, such as with heat, ice, and relaxation. And follow any other instructions your surgeon or nurse gives you.    URINARY RETENTION  Should you experience a decrease in your urine output or are unable to urinate following surgery, this can be due to the medications given during surgery.  We recommend you going to the nearest Emergency Department.    Tips for taking pain medication  To get the best relief possible, remember these points:  · Pain medications can upset your stomach. Taking them with a little food may help.  · Most pain relievers taken by mouth need at least 20 to 30 minutes to take effect.  · Taking medication on a schedule can help you remember to take it. Try to time your medication so that you can take it before beginning an activity, such as dressing, walking, or sitting down for dinner.  · Constipation is a common side effect  of pain medications. Contact your doctor before taking any medications like laxatives or stool softeners to help relieve constipation. Also ask about any dietary restrictions, because drinking lots of fluids and eating foods like fruits and vegetables that are high in fiber can also help. Remember, dont take laxatives unless your surgeon has prescribed them.  · Mixing alcohol and pain medication can cause dizziness and slow your breathing. It can even be fatal. Dont drink alcohol while taking pain medication.  · Pain medication can slow your reflexes. Dont drive or operate machinery while taking pain medication.  If your health care provider tells you to take acetaminophen to help relieve your pain, ask him or her how much you are supposed to take each day. (Acetaminophen is the generic name for Tylenol and other brand-name pain relievers.) Acetaminophen or other pain relievers may interact with your prescription medicines or other over-the-counter (OTC) drugs. Some prescription medications contain acetaminophen along with other active ingredients. Using both prescription and OTC acetaminophen for pain can cause you to overdose. The FDA recommends that you read the labels on your OTC medications carefully. This will help you to clearly understand the list of active ingredients, dosing instructions, and any warnings. It may also help you avoid taking too much acetaminophen. If you have questions or don't understand the information, ask your pharmacist or health care provider to explain it to you before you take the OTC medication.    Managing nausea  Some people have an upset stomach after surgery. This is often due to anesthesia, pain, pain medications, or the stress of surgery. The following tips will help you manage nausea and get good nutrition as you recover. If you were on a special diet before surgery, ask your doctor if you should follow it during recovery. These tips may help:  · Dont push yourself to  eat. Your body will tell you when to eat and how much.  · Start off with clear liquids and soup. They are easier to digest.  · Progress to semi-solid foods (mashed potatoes, applesauce, and gelatin) as you feel ready.  · Slowly move to solid foods. Dont eat fatty, rich, or spicy foods at first.  · Dont force yourself to have three large meals a day. Instead, eat smaller amounts more often.  · Take pain medications with a small amount of solid food, such as crackers or toast to avoid nausea.      Call your surgeon if    · You feel too sleepy, dizzy, or groggy (medication may be too strong).  · You have side effects like nausea, vomiting, or skin changes (rash, itching, or hives).   © 6422-5000 [employer]. [address] [place] [address], [place] [address]. All rights reserved. This information is not intended as a substitute for professional medical care. Always follow your healthcare professional's instructions.                  Select on Hyperlink below to print updated instructions from [employer]  B[employer]  Select on Hyperlink below to print updated instructions from [employer]  Shoulder Sling Shot 2 Breg Brace      CUBE

## 2019-03-07 NOTE — OR NURSING
Oral airway removed.  Arouses to name and opens eyes.  Follows commands and answers question correctly. Denies c/o pain to rt shoulder

## 2019-03-07 NOTE — OR NURSING
Reviewed 's  shoulder arthroscopy discharge instructions and demonstrated postoperative equipment (polar ice and BREG arm sling with pillow), with verbalized understanding per patient and parents.                                  .

## 2019-03-07 NOTE — PLAN OF CARE
Chago Mortoni has met all discharge criteria from Phase II. Vital Signs are stable, ambulating  without difficulty. Discharge instructions given, patient verbalized understanding. Discharged from facility via wheelchair in stable condition.

## 2019-03-07 NOTE — ANESTHESIA POSTPROCEDURE EVALUATION
"Anesthesia Post Evaluation    Patient: Chago Douglas    Procedure(s) Performed: Procedure(s) (LRB):  DEBRIDEMENT, SHOULDER, ARTHROSCOPIC (Right)  MANIPULATION, WITH ANESTHESIA (Right)    Final Anesthesia Type: general  Patient location during evaluation: PACU  Patient participation: Yes- Able to Participate  Level of consciousness: awake and alert  Post-procedure vital signs: reviewed and stable  Pain management: adequate  Airway patency: patent  PONV status at discharge: No PONV  Anesthetic complications: no      Cardiovascular status: blood pressure returned to baseline and stable  Respiratory status: unassisted, spontaneous ventilation and room air  Hydration status: euvolemic  Follow-up not needed.        Visit Vitals  BP (!) 163/83 (BP Location: Left arm, Patient Position: Lying)   Pulse 76   Temp 36.2 °C (97.1 °F) (Oral)   Resp 16   Ht 5' 11" (1.803 m)   Wt 74.4 kg (164 lb)   SpO2 97%   BMI 22.87 kg/m²       Pain/Neris Score: Pain Rating Prior to Med Admin: 0 (3/7/2019  7:27 AM)        "

## 2019-03-07 NOTE — INTERVAL H&P NOTE
The patient has been examined and the H&P has been reviewed:    I concur with the findings and no changes have occurred since H&P was written.    Anesthesia/Surgery risks, benefits and alternative options discussed and understood by patient/family.          Active Hospital Problems    Diagnosis  POA    Impingement syndrome of right shoulder [M75.41]  Yes      Resolved Hospital Problems   No resolved problems to display.

## 2019-03-07 NOTE — TRANSFER OF CARE
"Anesthesia Transfer of Care Note    Patient: Chago Douglas    Procedure(s) Performed: Procedure(s) (LRB):  DEBRIDEMENT, SHOULDER, ARTHROSCOPIC (Right)  MANIPULATION, WITH ANESTHESIA (Right)    Patient location: PACU    Anesthesia Type: general    Transport from OR: Transported from OR on 2-3 L/min O2 by NC with adequate spontaneous ventilation    Post pain: adequate analgesia    Post assessment: no apparent anesthetic complications and tolerated procedure well    Post vital signs: stable    Level of consciousness: sedated and responds to stimulation    Nausea/Vomiting: no nausea/vomiting    Complications: none    Transfer of care protocol was followed      Last vitals:   Visit Vitals  /73 (BP Location: Left arm, Patient Position: Lying)   Pulse 77   Temp 36.7 °C (98 °F) (Oral)   Resp 18   Ht 5' 11" (1.803 m)   Wt 74.4 kg (164 lb)   SpO2 95%   BMI 22.87 kg/m²     "

## 2019-03-07 NOTE — BRIEF OP NOTE
[facility]  Brief Operative Note     SUMMARY     Surgery Date: 3/7/2019     Surgeon(s) and Role:     * Genesis Nelson MD - Primary    Assisting Surgeon: Galo Christianson MD fellow  Wei Alfonso PA-C  Pre-op Diagnosis:  Impingement syndrome of right shoulder [M75.41]  Biceps tendinitis of right upper extremity [M75.21]  Rotator cuff syndrome of right shoulder [M75.101]    Post-op Diagnosis:  Post-Op Diagnosis Codes:     * Impingement syndrome of right shoulder [M75.41]     * Biceps tendinitis of right upper extremity [M75.21]     * Rotator cuff syndrome of right shoulder [M75.101]    Procedure(s) (LRB):  DEBRIDEMENT, SHOULDER, ARTHROSCOPIC (Right)  ARTHROSCOPY, SHOULDER, WITH SUBACROMIAL SPACE DECOMPRESSION (Right)  FIXATION, TENDON, Biceps Tenodesis (Right)  MANIPULATION, WITH ANESTHESIA (Right)  REPAIR, Microfracture (Crimson Duvet Technique) (Right)  Injection, Joint, Bio-D (Right)    Anesthesia: General    Description of the findings of the procedure: internal impingement    Findings/Key Components: see above    Estimated Blood Loss: * No values recorded between 3/7/2019  9:02 AM and 3/7/2019  9:35 AM *         Specimens:   Specimen (12h ago, onward)    None          Discharge Note    SUMMARY     Admit Date: 3/7/2019    Discharge Date and Time:  03/07/2019 9:36 AM    Hospital Course (synopsis of major diagnoses, care, treatment, and services provided during the course of the hospital stay): Tolerated well. Plan to dc home post op.     Final Diagnosis: Post-Op Diagnosis Codes:     * Impingement syndrome of right shoulder [M75.41]     * Biceps tendinitis of right upper extremity [M75.21]     * Rotator cuff syndrome of right shoulder [M75.101]    Disposition: Home or Self Care    Follow Up/Patient Instructions:     Medications:  Reconciled Home Medications:      Medication List      CONTINUE taking these medications    dextroamphetamine-amphetamine 15 MG 24 hr capsule  Commonly known as:  ADDERALL  XR  Take 1 capsule (15 mg total) by mouth once daily.     oxyCODONE-acetaminophen 7.5-325 mg per tablet  Commonly known as:  PERCOCET  Take 1 tablet by mouth every 4 to 6 hours as needed for Pain.     promethazine 25 MG tablet  Commonly known as:  PHENERGAN  Take 1 tablet (25 mg total) by mouth every 6 (six) hours as needed for Nausea.     traMADol 50 mg tablet  Commonly known as:  ULTRAM  Take 1-2 tablets ( mg total) by mouth every 6 (six) hours as needed (surgical pain).          Discharge Procedure Orders   Diet Adult Regular     Keep surgical extremity elevated     Ice to affected area     Notify your health care provider if you experience any of the following:  redness, tenderness, or signs of infection (pain, swelling, redness, odor or green/yellow discharge around incision site)     Notify your health care provider if you experience any of the following:  severe uncontrolled pain     Notify your health care provider if you experience any of the following:  persistent nausea and vomiting or diarrhea     Notify your health care provider if you experience any of the following:  temperature >100.4      Remove dressing in 72 hours     Activity as tolerated     Shower on day dressing removed (No bath)     Follow-up Information     Follow up In 2 weeks.    Why:  For wound re-check

## 2019-03-07 NOTE — OP NOTE
DATE OF PROCEDURE: 03/07/2019    SURGEON: Genesis Nelson M.D.    ASSISTANT: ELIAS Christianson MD PGY6  ASSISTANT: CHLOE Arnold PA-C      PREOPERATIVE DIAGNOSES:   right  1. Shoulder partial thickness cuff tear (internal impingement)  2. Shoulder posterior labral tear  3. Shoulder synovitis    POSTOPERATIVE DIAGNOSES:   right  1. Shoulder partial thickness cuff tear (internal impingement)  2. Shoulder posterior labral tear  3. Shoulder synovitis    OPERATION:   right  1. Shoulder arthroscopic partial thickness cuff debridement (internal impingement)  2. Shoulder arthroscopic posterior labral debridement  3. Shoulder arthroscopic extensive debridement (anterior, posterior glenohumeral joint) (CPT 14121)    ANESTHESIA:  General with intra-op suprascapular nerve block    BLOOD LOSS: Minimal.     DRAINS: None.     TOURNIQUET TIME: None.     COMPLICATIONS: None. The patient was moved to the recovery room in stable condition with compartments soft and cap refill less than a second in all digits.     BRIEF INDICATION OF MEDICAL NECESSITY: The patient is a 23 y.o. year-old male who has history and physical examination findings consistent with the above. Nonoperative versus operative options were discussed. The risks and benefits were discussed with the patient. The patient acknowledged understanding and wished to proceed with operative intervention. Informed consent was obtained prior to the procedure. Reasonable expectations and potential complications were discussed and acknowledged, including but not limited to infection, bleeding, blood clots, (DVT and/or PE), nerve injury, re-tear, instability, continued pain and stiffness. They agreed and   understood and wished to proceed.     EXAMINATION UNDER ANESTHESIA OF THE right SHOULDER: Forward elevation 175 degrees, External rotation at 0-60 degrees, External rotation at 0-90 degrees, Internal rotation at 90-60 degrees. Translation testing: anterior grade 1, posterior grade 1, sulcus sign  grade 1 corrects to 0 on external rotation. .    EXAMINATION UNDER ANESTHESIA OF THE NONOPERATED left SHOULDER: Forward elevation 175 degrees, External rotation at 0-60 degrees, External rotation at 0-90 degrees, Internal rotation at 90-60 degrees. Translation testing: anterior grade 1, posterior grade 1, sulcus sign grade 1 corrects to 0 on external rotation.     PROCEDURE IN DETAIL: After the correct operative site was marked by the operating surgeon. The patient was then taken to the operating room and placed supine on the operating room table, where the patient underwent general anesthesia by the anesthesia team.  Manipulation was performed gently with a palpable release of adhesions.  ROM post-manipulation as listed above.  The patient was then rolled into the lateral decubitus position with the operative side up. A well-padded axillary roll, beanbag and pillows were placed. All pressure points were carefully padded and checked. The upper extremities and both lower extremities were placed in comfortable positions and were also well-padded. The left upper extremity was then prepped and draped in the usual sterile fashion.     Suprascapular nerve block was performed in the standard fashion with 10cc local anesthetic.     The Spider arm positioner was implemented with balanced suspension and appropriate landmarks were noted on the skin. A posterior followed by casi-superior portals were created and systematic examination of the joint revealed the following:     There was no evidence of any significant chondral lesions to the glenoid or humeral head.     Partial thickness 15% cuff tearing on posterior (infraspinatus) articular side was debrided with shaver and gently with thermal device.    Internal impingement lesion was noted to posterior-superior labrum and infraspinatus on ABER positioning and ROM with scope in place..    There was some synovitis in the labrum that was debrided as needed, both anteriorly and  posteriorly in the glenohumeral joint.     Biceps was pristine on ramp sign.    Debridement was performed with the shaver and thermal device in the anterior glenohumeral joint, posterior glenohumeral joint to posterior labral tear.     Suprascapular nerve block was performed in the standard fashion with 10cc local anesthetic. Local was placed about portals and incision(s) after irrigation, as described below, was completed. The shoulder was then irrigated and fluid was extravasated using suction. All portals were reapproximated using inverted 4-0 Monocryl suture in the subcutaneous tissue of the portals. Mastisol and Steri-strips were placed with Xeroform, 4x4s, ABD pad, and Medi-pore tape. An Iceman was secured in the shoulder mccoy. A shoulder mccoy was secured. The patient was then moved to supine, extubated and taken to the recovery room where the patient arrived in stable condition with the compartments of the arm and forearm soft and cap refill less than a second in all digits.    POST OPERATIVE PLAN: We will follow the arthroscopic debridement guidelines. We discussed with the patient's family after surgery. The patient will remain in a sling for 1-2 weeks.  Wean sling x 1-2 weeks, no ROM restrictions.

## 2019-03-11 ENCOUNTER — TELEPHONE (OUTPATIENT)
Dept: SPORTS MEDICINE | Facility: CLINIC | Age: 24
End: 2019-03-11

## 2019-03-13 ENCOUNTER — CLINICAL SUPPORT (OUTPATIENT)
Dept: REHABILITATION | Facility: HOSPITAL | Age: 24
End: 2019-03-13
Attending: ORTHOPAEDIC SURGERY
Payer: COMMERCIAL

## 2019-03-13 DIAGNOSIS — M25.60 RANGE OF MOTION DEFICIT: ICD-10-CM

## 2019-03-13 PROCEDURE — 97140 MANUAL THERAPY 1/> REGIONS: CPT

## 2019-03-13 PROCEDURE — 97161 PT EVAL LOW COMPLEX 20 MIN: CPT

## 2019-03-13 PROCEDURE — 97110 THERAPEUTIC EXERCISES: CPT

## 2019-03-13 NOTE — PLAN OF CARE
[facility]  Physical Therapy Initial Evaluation    Name: Chago Douglas  Clinic Number: [id number]    Therapy Diagnosis:   Encounter Diagnosis   Name Primary?    Range of motion deficit      Physician: Gilmar Arnold III, *    Physician Orders: eval and treat  Medical Diagnosis:    M75.41 (ICD-10-CM) - Impingement syndrome of right shoulder   M75.21 (ICD-10-CM) - Biceps tendinitis of right upper extremity   M25.511,G89.29 (ICD-10-CM) - Chronic right shoulder pain   M75.101 (ICD-10-CM) - Rotator cuff syndrome of right shoulder       Date of Surgery:3/7/19  Evaluation Date: 3/13/2019  Authorization Period Expiration: 12/31/19  Plan of Care Certification Period: 7/3/19  Visit # / Visits authorized: 1/ 25    Time In: 1010  Time Out: 1100  Total Billable Time: 50 minutes    Precautions: Standard    Subjective   Date of onset: chronic, 10+ months   History of current condition - Chago reports: Pt has had chronic right shoulder pain with throwing.  Has received care in the past, is currently playing baseball for [email or web address].  Pt no longer plays at the [employer], and is unsure if he wants to play again.          No past medical history on file.  Chago Douglas  has a past surgical history that includes [place] tooth extraction; Arthroscopic debridement of shoulder (Right, 3/7/2019); and Manipulation with anesthesia (Right, 3/7/2019).    Chago has a current medication list which includes the following prescription(s): dextroamphetamine-amphetamine, oxycodone-acetaminophen, promethazine, and tramadol.    Review of patient's allergies indicates:   Allergen Reactions    Augmentin [amoxicillin-pot clavulanate] Other (See Comments)     As a baby - unknown        Imaging, MRI studies:  Articular surface fraying of the posterior infraspinatus tendon with subcortical cystic change, which can be seen with internal impingement.  No discrete labral tear identified.    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  Pain  WNL  Dim.  Pain    Shoulder Abduction (C5)   x x     Elbow Flex (C6)  x  x     Wrist Ext (C7) x   x     Finger Flex (C8) x   x     Finger Abd (TI) x   x     Hip Flexion (L2-L3)  x   x     Knee Extension (L3-L4)  x   x     Dorsiflexion (L4)  x   x     Hallux Extension (L5)  x   x     Ankle Eversion (S1-S2)  x   x            DTR WNL  Dim.  Absent    Right Bicep x     Left Bicep x     Right Tricep x     Left Tricep x     Right Brachiorad x     Left Brachiorad x     Right-Quad  x     Left-Quad  x     Right Ankle  x     Left Ankle  x       Sensation:     Neurologicalc Screening- Sensory  Right    Left      LEVEL  WNL  Dim.  Absent  WNL  Dim.  Absent    L1 (inguinal area)  x   x     L2 (anterior mid-thigh)  x   x     L3 (distal anterior thigh)  x   x     L4 (medial lower leg/foot)  x   x     L5 (lateral leg/ foot)  x   x     S1 (lateral side of foot)  x   x                       Neurologicalc Screening- Sensory        LEVEL  WNL  Dim.  Absent  WNL  Dim.  Absent    C4 (Skin over AC jt) x        C5 (radial side of elbow) x        C6 (Dorsal surface of thumb) x        C7 (Dorsal 3rd digit) x        C8 (Dorsal 5th digit) x              ROM: Active/PROM           Shoulder ROM  Right  Left    Flexion 155 175   Abduction  130 175   Extension  20 50   IR in 90 Abd 20 50   ER in 90 Abd 50 85   Total motion   WNL   Horiz Add            Elbow ROM  Right  Left    Flexion WNL WNL   Extension   WNL WNL   Supination WNL WNL   Pronation  WNL WNL           Strength: able to resist submaximal pressure in all planes on R shoulder with minimal discomfort. L shoulder WNL 5/5 all planes with no symptoms.      GAIT: Normal        CMS Impairment/Limitation/Restriction for FOTO Shoulder Survey  Status Limitation G-Code CMS Severity Modifier  Intake 44% 56% Current Status CK - At least 40 percent but less than 60 percent  Predicted 78% 22% Goal Status+ CJ - At least 20 percent but less than 40 percent  D/C Status CK **only report if this is a one time visit  Therapist reviewed FOTO scores for Chago Douglas on 3/13/2019.   FOTO documents entered into [employer] - see Media section.    Limitation Score: 56%  Category: Carrying             TREATMENT   Treatment Time In: 10:40   Treatment Time Out: 11:10  Total Treatment time separate from Evaluation time:30 min    Chago received therapeutic exercises to develop strength, endurance and ROM for 15 minutes including:  Wand exercises for flexion and ER as well and scapular squeeze and pendulums.     Chago received the following manual therapy techniques: Joint mobilizations were applied to the: R shoulder for 15 minutes, including:  AP/PA glenohumeral joint mobilizations grade 2, passive ROM in flexion, abduction, IR, and ER.       Home Exercises and Patient Education Provided    Education provided re: post-op precautions, timeframe for recovery    Written Home Exercises Provided: Yes.  Exercises were reviewed and Chago was able to demonstrate them prior to the end of the session.   Pt received a written copy of exercises to perform at home. Chago demonstrated good  understanding of the education provided.     See EMR under patient instructions for exercises given.   Assessment   Chago is a 23 y.o. male referred to outpatient Physical Therapy with a medical diagnosis of limited R shoulder ROM and strength due to pain and post-operative changes as expected.     Pt prognosis is Good.    Pt will benefit from skilled outpatient Physical Therapy to address the deficits stated above and in the chart below, provide pt/family education, and to maximize pt's level of independence.     Plan of care discussed with patient: Yes  Pt's spiritual, cultural and educational needs considered and patient is agreeable to the plan of care and goals as stated below:     Anticipated Barriers for therapy: decreased compliance with HEP noted.     Medical Necessity is demonstrated by the following  History  Co-morbidities and personal factors that may impact the plan of care Co-morbidities:   none reported    Personal Factors:   no deficits     low   Examination  Body Structures and Functions, activity limitations and participation restrictions that may impact the plan of care Body Regions:   upper extremities    Body Systems:    ROM    Participation Restrictions:       Activity limitations:   Learning and applying knowledge  no deficits    General Tasks and Commands  no deficits    Communication  no deficits    Mobility  lifting and carrying objects    Self care  no deficits    Domestic Life  no deficits    Interactions/Relationships  no deficits    Life Areas  no deficits    Community and Social Life  no deficits         low   Clinical Presentation stable and uncomplicated low   Decision Making/ Complexity Score: low     Goals:  Short Term GOALS:  In 4-8 weeks, pt. will:  1. Pt will increase ROM to the to R shoulder to > 160 deg in order to perform ADLs and IADLs more effectively   2. Decrease overall pain to 2/10 on average with daily activities   3. Increase strength to the R shoulder to 4/5 in order to perform ADLs and IADLs more effectively   4. Improve FOTO intake score to 80 to demonstrate improvement with functional mobility and use  5. Independent with HEP  Long Term GOALS:  In 8-16 weeks, pt. will:  1. Pt will increase ROM to the R shoulder external rotation at 90 to 95 deg in order to perform  ADLs and IADLs more effectively   2. Decrease overall pain to 0/10 on average with daily activities   3. Increase strength to the R shoulder to 5/5 in order to perform ADLs and IADLs more effectively   4. Improve FOTO intake score to 90 to demonstrate improvement with functional mobility and use  5. Independent with HEP  6. Return to full participation with baseball unrestricted.      Plan   Certification Period/Plan of care expiration: 3/13/2019 to 7/3/19.    Outpatient Physical Therapy 3 times weekly for 12-16 weeks to include the following interventions: Aquatic Therapy, Electrical Stimulation prn, Manual Therapy, Moist Heat/ Ice, Neuromuscular Re-ed, Patient Education, Therapeutic Activites and Therapeutic Exercise.     Marcellus Faustin, PT, Kunal Ivy PT

## 2019-03-19 NOTE — PROGRESS NOTES
Physical Therapy Daily Treatment Note     Name: Chago Douglas  Clinic Number: [id number]    Therapy Diagnosis:   Encounter Diagnosis   Name Primary?    Range of motion deficit      Physician: Gilmar Arnold III, *    Visit Date: 3/20/2019    Physician Orders: eval and treat  Medical Diagnosis:    M75.41 (ICD-10-CM) - Impingement syndrome of right shoulder   M75.21 (ICD-10-CM) - Biceps tendinitis of right upper extremity   M25.511,G89.29 (ICD-10-CM) - Chronic right shoulder pain   M75.101 (ICD-10-CM) - Rotator cuff syndrome of right shoulder         Date of Surgery: 3/7/19  Evaluation Date: 3/13/2019  Authorization Period Expiration: 12/31/19  Plan of Care Certification Period: 7/3/19  Visit # / Visits authorized: 2/ 25      Time In: 0908  Time Out: 0945  Total Billable Time: 35 minutes    Precautions: Standard    Subjective     Pt reports: his shoulder is feeling better overall. Some discomfort when he moves his arm, but not bad.   He was compliant with home exercise program.He has f/u with MD right after this PT appt.   Response to previous treatment: good, feels better with HEP/ROM  Functional change: improved PROM    Pain: 0/10  Location: right shoulder      Objective     POD 13    Chago received therapeutic exercises to develop strength, ROM and flexibility for 25 minutes including:  PROM R shoulder all planes  Chest press mitul 1 lb bar 2 x 10  AAROM flexion 1 lb bar 90-full available (approx 150/160 deg) gravity eliminated 3 x 10  Pull downs 3 x 10 scapular squeeze YTB  Rows 3 x 10 scapular squeeze YTB  Isometrics shoulder flx, abd, extension 10 x 8 sec each      Chago received the following manual therapy techniques: Joint mobilizations were applied to the: R shoulder for 10 minutes, including:  AP, inferior glides grade 2/3  Scapular mobilizations all planes grade 3      Home Exercises Provided and Patient Education Provided     Education provided:   - use of sling, post-op precautions    Written Home  Exercises Provided: Patient instructed to cont prior HEP.  Exercises were reviewed and Chago was able to demonstrate them prior to the end of the session.  Chago demonstrated good  understanding of the education provided.     See EMR under Media for exercises provided prior visit.    Assessment     Pt reported feeling better with session, His ROM is progressing well with minimal discomfort reported. Progressing scapular strength today with no complaints.     Chago is progressing well towards his goals.   Pt prognosis is Excellent.     Pt will continue to benefit from skilled outpatient physical therapy to address the deficits listed in the problem list box on initial evaluation, provide pt/family education and to maximize pt's level of independence in the home and community environment.     Pt's spiritual, cultural and educational needs considered and pt agreeable to plan of care and goals.     Anticipated barriers to physical therapy: none noted/reported    Short Term GOALS:  In 4-8 weeks, pt. will:  1. Pt will increase ROM to the to R shoulder to > 160 deg in order to perform ADLs and IADLs more effectively (progressing, not met)  2. Decrease overall pain to 2/10 on average with daily activities progressing, not met)  3. Increase strength to the R shoulder to 4/5 in order to perform ADLs and IADLs more effectively progressing, not met)  4. Improve FOTO intake score to 80 to demonstrate improvement with functional mobility and use progressing, not met)  5. Independent with HEP progressing, not met)  Long Term GOALS:  In 8-16 weeks, pt. will:  1. Pt will increase ROM to the R shoulder external rotation at 90 to 95 deg in order to perform ADLs and IADLs more effectively progressing, not met)  2. Decrease overall pain to 0/10 on average with daily activities progressing, not met)  3. Increase strength to the R shoulder to 5/5 in order to perform ADLs and IADLs more effectively progressing, not met)  4. Improve FOTO  intake score to 90 to demonstrate improvement with functional mobility and use progressing, not met)  5. Independent with HEP progressing, not met)  6. Return to full participation with baseball unrestricted.(progressing, not met)    Plan     Continue with ROM and periscapular strength within post-op precautions.     Kunal Ivy, PT

## 2019-03-20 ENCOUNTER — OFFICE VISIT (OUTPATIENT)
Dept: SPORTS MEDICINE | Facility: CLINIC | Age: 24
End: 2019-03-20
Payer: COMMERCIAL

## 2019-03-20 ENCOUNTER — CLINICAL SUPPORT (OUTPATIENT)
Dept: REHABILITATION | Facility: HOSPITAL | Age: 24
End: 2019-03-20
Attending: ORTHOPAEDIC SURGERY
Payer: COMMERCIAL

## 2019-03-20 VITALS
HEIGHT: 71 IN | BODY MASS INDEX: 22.96 KG/M2 | SYSTOLIC BLOOD PRESSURE: 151 MMHG | HEART RATE: 70 BPM | WEIGHT: 164 LBS | DIASTOLIC BLOOD PRESSURE: 78 MMHG

## 2019-03-20 DIAGNOSIS — M25.60 RANGE OF MOTION DEFICIT: ICD-10-CM

## 2019-03-20 DIAGNOSIS — Z98.890 STATUS POST ARTHROSCOPY OF RIGHT SHOULDER: Primary | ICD-10-CM

## 2019-03-20 PROCEDURE — 99024 PR POST-OP FOLLOW-UP VISIT: ICD-10-PCS | Mod: S$GLB,,, | Performed by: ORTHOPAEDIC SURGERY

## 2019-03-20 PROCEDURE — 99999 PR PBB SHADOW E&M-EST. PATIENT-LVL III: CPT | Mod: PBBFAC,,, | Performed by: ORTHOPAEDIC SURGERY

## 2019-03-20 PROCEDURE — 99024 POSTOP FOLLOW-UP VISIT: CPT | Mod: S$GLB,,, | Performed by: ORTHOPAEDIC SURGERY

## 2019-03-20 PROCEDURE — 99999 PR PBB SHADOW E&M-EST. PATIENT-LVL III: ICD-10-PCS | Mod: PBBFAC,,, | Performed by: ORTHOPAEDIC SURGERY

## 2019-03-20 PROCEDURE — 97140 MANUAL THERAPY 1/> REGIONS: CPT

## 2019-03-20 PROCEDURE — 97110 THERAPEUTIC EXERCISES: CPT

## 2019-03-20 NOTE — PROGRESS NOTES
CC right shoulder pain    HISTORY OF PRESENT ILLNESS:   Pt is here today for first post-operative followup of his shoulder arthroscopy.  he is doing well.  We have reviewed his findings and discussed plan of care and future treatment options.                 Patient has been attending physical therapy at the [facility] location, working with Marcellus.  Patient has discontinued pain medications     DATE OF PROCEDURE: 03/07/2019  OPERATION:   right  1. Shoulder arthroscopic partial thickness cuff debridement (internal impingement)  2. Shoulder arthroscopic posterior labral debridement  3. Shoulder arthroscopic extensive debridement (anterior, posterior glenohumeral joint) (CPT 36256)                                                                    PHYSICAL EXAMINATION:     Incision sites healed well  No evidence of any erythema, infection or induration  elbow Range of motion full   No effusion  2+ DP pulse  No swelling                                                                                ASSESSMENT:                                                                                                                                               1. Status post above, doing well.                                                                                                                               PLAN:                                                                                                                                                     1. Continue PT  2. Emphasized scapular function.  3. I have discussed return to activity in detail, may be able to begin throwing at 3 months status post.  4. he will see us back in 4 weeks.                                      5. All questions were answered and he should contact us if he  has any questions or concerns in the interim.                                                           POST OPERATIVE PLAN: We will follow the arthroscopic debridement  guidelines. We discussed with the patient's family after surgery. The patient will remain in a sling for 1-2 weeks.  Wean sling x 1-2 weeks, no ROM restrictions.

## 2019-03-27 ENCOUNTER — CLINICAL SUPPORT (OUTPATIENT)
Dept: REHABILITATION | Facility: HOSPITAL | Age: 24
End: 2019-03-27
Attending: ORTHOPAEDIC SURGERY
Payer: COMMERCIAL

## 2019-03-27 DIAGNOSIS — M25.60 RANGE OF MOTION DEFICIT: ICD-10-CM

## 2019-03-27 PROCEDURE — 97140 MANUAL THERAPY 1/> REGIONS: CPT

## 2019-03-27 PROCEDURE — 97110 THERAPEUTIC EXERCISES: CPT

## 2019-03-27 NOTE — PROGRESS NOTES
Physical Therapy Daily Treatment Note     Name: Chago Douglas  Clinic Number: [id number]    Therapy Diagnosis:   Encounter Diagnosis   Name Primary?    Range of motion deficit      Physician: Gilmar Arnold III, *    Visit Date: 3/27/2019    Physician Orders: eval and treat  Medical Diagnosis:    M75.41 (ICD-10-CM) - Impingement syndrome of right shoulder   M75.21 (ICD-10-CM) - Biceps tendinitis of right upper extremity   M25.511,G89.29 (ICD-10-CM) - Chronic right shoulder pain   M75.101 (ICD-10-CM) - Rotator cuff syndrome of right shoulder         Date of Surgery: 3/7/19  Evaluation Date: 3/13/2019  Authorization Period Expiration: 12/31/19  Plan of Care Certification Period: 7/3/19  Visit # / Visits authorized: 3/ 25      Time In: 0908  Time Out: 0955  Total Billable Time: 45 minutes    Precautions: Standard  PLAN:                                                                                                                                                     1. Continue PT  2. Emphasized scapular function.  3. I have discussed return to activity in detail, may be able to begin throwing at 3 months status post.  4. he will see us back in 4 weeks.                                      5. All questions were answered and he should contact us if he  has any questions or concerns in the interim.                                                            POST OPERATIVE PLAN: We will follow the arthroscopic debridement guidelines. We discussed with the patient's family after surgery. The patient will remain in a sling for 1-2 weeks.  Wean sling x 1-2 weeks, no ROM restrictions.      Subjective     Pt reports: his f/u went well, MD pleased. He is weaning from sling, out for the most part. No significant pain, some stiffness, but mainly feels weak.   He was compliant with home exercise program.  Response to previous treatment: good, feels better with HEP/ROM  Functional change: improved PROM/AROM    Pain:  0/10  Location: right shoulder      Objective     POD 20    Supine AROM flexion 170 deg no pain; difficult when against gravity  PROM flexion- 175 deg, abduction- 170 deg, ER(45)- 80 deg, IR(45)- 50 deg- no pain  Standing AROM elevation 50 deg- feels weak at entry; at exit standing AROM elevation 110 deg- no pain- minimal shrug noted    Chago received therapeutic exercises to develop strength, ROM and flexibility for 35 minutes including:  PROM R shoulder all planes  Chest press mitul 3 lb bar 3 x 10  Supine SA punch 3 lbs 3 x 10 at 90 deg  Supine fig 8's x 30 with 3 lbs at 90 deg  Supine AAROM with 3 lb dowel. x30  Pull downs 3 x 10 scapular squeeze OTB  Rows 3 x 10 scapular squeeze OTB  Isometrics shoulder flx, extension, IR, ER walk outs YTB x 10 each  Supine AROM flexion x 20 reps- slow/controlled  Standing scaption- mirror visual feedback- NP      Chago received the following manual therapy techniques: Joint mobilizations were applied to the: R shoulder for 10 minutes, including:  AP, inferior glides grade 2/3  Scapular mobilizations all planes grade 3      Home Exercises Provided and Patient Education Provided     Education provided:   - HEP review, weaning from sling    Written Home Exercises Provided: Patient instructed to cont prior HEP.  Exercises were reviewed and Chago was able to demonstrate them prior to the end of the session.  Chago demonstrated good  understanding of the education provided.     See EMR under Media for exercises provided prior visit.    Assessment     Pt reported feeling better with session, His ROM is progressing well with minimal discomfort reported. Progressing scapular and glenohumeral strength today with no complaints. He continues to have difficulty with AROM against gravity due to weakness, not pain. After session his AROM against gravity increased significantly, surprising himself. His PROM has progressed very well.     Chago is progressing well towards his goals.   Pt prognosis is  Excellent.     Pt will continue to benefit from skilled outpatient physical therapy to address the deficits listed in the problem list box on initial evaluation, provide pt/family education and to maximize pt's level of independence in the home and community environment.     Pt's spiritual, cultural and educational needs considered and pt agreeable to plan of care and goals.     Anticipated barriers to physical therapy: none noted/reported    Short Term GOALS:  In 4-8 weeks, pt. will:  1. Pt will increase ROM to the to R shoulder to > 160 deg in order to perform ADLs and IADLs more effectively (progressing, not met)  2. Decrease overall pain to 2/10 on average with daily activities progressing, not met)  3. Increase strength to the R shoulder to 4/5 in order to perform ADLs and IADLs more effectively progressing, not met)  4. Improve FOTO intake score to 80 to demonstrate improvement with functional mobility and use progressing, not met)  5. Independent with HEP progressing, not met)  Long Term GOALS:  In 8-16 weeks, pt. will:  1. Pt will increase ROM to the R shoulder external rotation at 90 to 95 deg in order to perform ADLs and IADLs more effectively progressing, not met)  2. Decrease overall pain to 0/10 on average with daily activities progressing, not met)  3. Increase strength to the R shoulder to 5/5 in order to perform ADLs and IADLs more effectively progressing, not met)  4. Improve FOTO intake score to 90 to demonstrate improvement with functional mobility and use progressing, not met)  5. Independent with HEP progressing, not met)  6. Return to full participation with baseball unrestricted.(progressing, not met)    Plan     Continue with shoulder ROM and periscapular/glenohumeral strength as tolerated, move against gravity as able.     Kunal Ivy, PT

## 2019-04-03 ENCOUNTER — CLINICAL SUPPORT (OUTPATIENT)
Dept: REHABILITATION | Facility: HOSPITAL | Age: 24
End: 2019-04-03
Attending: ORTHOPAEDIC SURGERY
Payer: COMMERCIAL

## 2019-04-03 DIAGNOSIS — M25.60 RANGE OF MOTION DEFICIT: ICD-10-CM

## 2019-04-03 PROCEDURE — 97110 THERAPEUTIC EXERCISES: CPT

## 2019-04-03 PROCEDURE — 97140 MANUAL THERAPY 1/> REGIONS: CPT

## 2019-04-03 NOTE — PROGRESS NOTES
Physical Therapy Daily Treatment Note     Name: Chago Douglas  Clinic Number: [id number]    Therapy Diagnosis:   Encounter Diagnosis   Name Primary?    Range of motion deficit      Physician: Gilmar Arnold III, *    Visit Date: 4/3/2019    Physician Orders: eval and treat  Medical Diagnosis:    M75.41 (ICD-10-CM) - Impingement syndrome of right shoulder   M75.21 (ICD-10-CM) - Biceps tendinitis of right upper extremity   M25.511,G89.29 (ICD-10-CM) - Chronic right shoulder pain   M75.101 (ICD-10-CM) - Rotator cuff syndrome of right shoulder         Date of Surgery: 3/7/19  Evaluation Date: 3/13/2019  Authorization Period Expiration: 12/31/19  Plan of Care Certification Period: 7/3/19  Visit # / Visits authorized: 4/ 25      Time In: 0908  Time Out: 0955  Total Billable Time: 45 minutes    Precautions: Standard  PLAN:                                                                                                                                                     1. Continue PT  2. Emphasized scapular function.  3. I have discussed return to activity in detail, may be able to begin throwing at 3 months status post.  4. he will see us back in 4 weeks.                                      5. All questions were answered and he should contact us if he  has any questions or concerns in the interim.                                                            POST OPERATIVE PLAN: We will follow the arthroscopic debridement guidelines. We discussed with the patient's family after surgery. The patient will remain in a sling for 1-2 weeks.  Wean sling x 1-2 weeks, no ROM restrictions.      Subjective     Pt reports: His R shoulder is feeling better. Just gets a little sore with increased use, feels weak. He feels his ROM is doing well.       He was compliant with home exercise program.  Response to previous treatment: good, feels better with HEP/ROM  Functional change: improved AROM    Pain: 0/10  Location: right shoulder       Objective     POD 27    Standing AROM elevation to 170 deg- no pain; no obvious shrug noted  PROM flexion- 175 deg, abduction- 170 deg, ER(45)- 85 deg, IR(45)- 50 deg- no pain      Chago received therapeutic exercises to develop strength, ROM and flexibility for 35 minutes including:  PROM R shoulder all planes  Supine SA punch 5 lbs 3 x 10 at 90 deg  Supine fig 8's x 30 with 5 lbs at 90 deg  Supine flexion 3 x 10 3 lbs  Sl abd 3 x 10 0 lbs  SL ER 0 lbs 3 x 10  Pull downs 3 x 10 scapular squeeze OTB  Prone row- 3 lbs, extension-0 lbs 3 x 10 each   Standing scaption- mirror visual feedback- 2 x 8; cuing scap squeeze  UBE 3/3  Pulleys flx and abduction 2 min each    Chago received the following manual therapy techniques: Joint mobilizations were applied to the: R shoulder for 10 minutes, including:  AP, inferior glides grade 2/3  Scapular mobilizations all planes grade 3      Home Exercises Provided and Patient Education Provided     Education provided:   - HEP review, precautions    Written Home Exercises Provided: Patient instructed to cont prior HEP.  Exercises were reviewed and Chago was able to demonstrate them prior to the end of the session.  Chago demonstrated good  understanding of the education provided.     See EMR under Media for exercises provided prior visit.    Assessment     Pt reported feeling better with session. His ROM is progressing well with minimal discomfort reported. Progressing scapular and glenohumeral strength today with no complaints. His AROM against gravity was full elevation after session today with no pain reported.     Chago is progressing well towards his goals.   Pt prognosis is Excellent.     Pt will continue to benefit from skilled outpatient physical therapy to address the deficits listed in the problem list box on initial evaluation, provide pt/family education and to maximize pt's level of independence in the home and community environment.     Pt's spiritual, cultural and  educational needs considered and pt agreeable to plan of care and goals.     Anticipated barriers to physical therapy: none noted/reported    Short Term GOALS:  In 4-8 weeks, pt. will:  1. Pt will increase ROM to the to R shoulder to > 160 deg in order to perform ADLs and IADLs more effectively (met)  2. Decrease overall pain to 2/10 on average with daily activities progressing, not met)  3. Increase strength to the R shoulder to 4/5 in order to perform ADLs and IADLs more effectively progressing, not met)  4. Improve FOTO intake score to 80 to demonstrate improvement with functional mobility and use progressing, not met)  5. Independent with HEP progressing, not met)  Long Term GOALS:  In 8-16 weeks, pt. will:  1. Pt will increase ROM to the R shoulder external rotation at 90 to 95 deg in order to perform ADLs and IADLs more effectively progressing, not met)  2. Decrease overall pain to 0/10 on average with daily activities progressing, not met)  3. Increase strength to the R shoulder to 5/5 in order to perform ADLs and IADLs more effectively progressing, not met)  4. Improve FOTO intake score to 90 to demonstrate improvement with functional mobility and use progressing, not met)  5. Independent with HEP progressing, not met)  6. Return to full participation with baseball unrestricted.(progressing, not met)    Plan     Continue with shoulder AROM and periscapular/glenohumeral strength as tolerated.     Kunal Ivy, PT

## 2019-04-10 ENCOUNTER — CLINICAL SUPPORT (OUTPATIENT)
Dept: REHABILITATION | Facility: HOSPITAL | Age: 24
End: 2019-04-10
Attending: ORTHOPAEDIC SURGERY
Payer: COMMERCIAL

## 2019-04-10 DIAGNOSIS — M25.60 RANGE OF MOTION DEFICIT: ICD-10-CM

## 2019-04-10 PROCEDURE — 97140 MANUAL THERAPY 1/> REGIONS: CPT

## 2019-04-10 PROCEDURE — 97110 THERAPEUTIC EXERCISES: CPT

## 2019-04-10 NOTE — PROGRESS NOTES
Physical Therapy Daily Treatment Note     Name: Chago Douglas  Clinic Number: [id number]    Therapy Diagnosis:   Encounter Diagnosis   Name Primary?    Range of motion deficit      Physician: Gilmar Arnold III, *    Visit Date: 4/10/2019    Physician Orders: eval and treat  Medical Diagnosis:    M75.41 (ICD-10-CM) - Impingement syndrome of right shoulder   M75.21 (ICD-10-CM) - Biceps tendinitis of right upper extremity   M25.511,G89.29 (ICD-10-CM) - Chronic right shoulder pain   M75.101 (ICD-10-CM) - Rotator cuff syndrome of right shoulder         Date of Surgery: 3/7/19  Evaluation Date: 3/13/2019  Authorization Period Expiration: 12/31/19  Plan of Care Certification Period: 7/3/19  Visit # / Visits authorized: 5/ 25      Time In: 0900  Time Out: 0950  Total Billable Time: 50 minutes    Precautions: Standard  PLAN:                                                                                                                                                     1. Continue PT  2. Emphasized scapular function.  3. I have discussed return to activity in detail, may be able to begin throwing at 3 months status post.  4. he will see us back in 4 weeks.                                      5. All questions were answered and he should contact us if he  has any questions or concerns in the interim.                                                            POST OPERATIVE PLAN: We will follow the arthroscopic debridement guidelines. We discussed with the patient's family after surgery. The patient will remain in a sling for 1-2 weeks.  Wean sling x 1-2 weeks, no ROM restrictions.      Subjective     Pt reports: His R shoulder feels normal. He forgets he had surgery, doing ADLs like normal. He feels he has full ROM at this time. Strength is progressing.  No soreness after last session.      He was compliant with home exercise program.  Response to previous treatment: no soreness  Functional change: improved  AROM/strength    Pain: 0/10  Location: right shoulder        Objective     POD 34    Full AROM and = mitul no pain except HBB reaching limited: R= 1 inch from inferior scap, L= mid scapula      Chago received therapeutic exercises to develop strength, ROM and flexibility for 40 minutes including:  PROM R shoulder all planes  Supine SA punch 8 lbs 3 x 10 at 90 deg  Supine fig 8's x 30 with 8 lbs at 90 deg  Supine flexion 3 x 10 5 lbs full AROM  SL abd 3 x 10 3 lbs  SL ER 2 lbs 3 x 10  Pull downs 3 x 10 scapular squeeze OTB  Prone row- 8 lbs, extension-5 lbs 3 x 15 each   Standing scaption- mirror visual feedback- 2 x 8; cuing scap squeeze  UBE 4/4  Pulleys flx, HBB, and abduction 2 min each  Standing D2 GTB 3 x 10  IR/ER standing at 45 deg 3 x 15 each OTB    Chago received the following manual therapy techniques: Joint mobilizations were applied to the: R shoulder for 10 minutes, including:  AP, inferior glides grade 3/4  Scapular mobilizations all planes grade 3      Home Exercises Provided and Patient Education Provided     Education provided:   - HEP review    Written Home Exercises Provided: Patient instructed to cont prior HEP.  Exercises were reviewed and Chago was able to demonstrate them prior to the end of the session.  Chago demonstrated good  understanding of the education provided.     See EMR under Media for exercises provided prior visit.    Assessment     Pt reported feeling better with session. He is doing very well with progression of strengthening. His ROM is full with the exception of HBB reaching, all pain free.     Chago is progressing well towards his goals.   Pt prognosis is Excellent.     Pt will continue to benefit from skilled outpatient physical therapy to address the deficits listed in the problem list box on initial evaluation, provide pt/family education and to maximize pt's level of independence in the home and community environment.     Pt's spiritual, cultural and educational needs  considered and pt agreeable to plan of care and goals.     Anticipated barriers to physical therapy: none noted/reported    Short Term GOALS:  In 4-8 weeks, pt. will:  1. Pt will increase ROM to the to R shoulder to > 160 deg in order to perform ADLs and IADLs more effectively (met)  2. Decrease overall pain to 2/10 on average with daily activities progressing, not met)  3. Increase strength to the R shoulder to 4/5 in order to perform ADLs and IADLs more effectively progressing, not met)  4. Improve FOTO intake score to 80 to demonstrate improvement with functional mobility and use progressing, not met)  5. Independent with HEP progressing, not met)  Long Term GOALS:  In 8-16 weeks, pt. will:  1. Pt will increase ROM to the R shoulder external rotation at 90 to 95 deg in order to perform ADLs and IADLs more effectively progressing, not met)  2. Decrease overall pain to 0/10 on average with daily activities progressing, not met)  3. Increase strength to the R shoulder to 5/5 in order to perform ADLs and IADLs more effectively progressing, not met)  4. Improve FOTO intake score to 90 to demonstrate improvement with functional mobility and use progressing, not met)  5. Independent with HEP progressing, not met)  6. Return to full participation with baseball unrestricted.(progressing, not met)    Plan     Continue with shoulder AROM and periscapular/glenohumeral strength as tolerated. Advance exercises as tolerated for return to throwing.     Kunal Ivy, PT

## 2019-04-17 ENCOUNTER — CLINICAL SUPPORT (OUTPATIENT)
Dept: REHABILITATION | Facility: HOSPITAL | Age: 24
End: 2019-04-17
Attending: ORTHOPAEDIC SURGERY
Payer: COMMERCIAL

## 2019-04-17 ENCOUNTER — OFFICE VISIT (OUTPATIENT)
Dept: SPORTS MEDICINE | Facility: CLINIC | Age: 24
End: 2019-04-17
Payer: COMMERCIAL

## 2019-04-17 VITALS
SYSTOLIC BLOOD PRESSURE: 140 MMHG | HEART RATE: 85 BPM | WEIGHT: 164 LBS | DIASTOLIC BLOOD PRESSURE: 89 MMHG | BODY MASS INDEX: 22.96 KG/M2 | HEIGHT: 71 IN

## 2019-04-17 DIAGNOSIS — M25.60 RANGE OF MOTION DEFICIT: ICD-10-CM

## 2019-04-17 DIAGNOSIS — M75.41 IMPINGEMENT SYNDROME OF RIGHT SHOULDER: Primary | ICD-10-CM

## 2019-04-17 PROCEDURE — 99024 POSTOP FOLLOW-UP VISIT: CPT | Mod: S$GLB,,, | Performed by: ORTHOPAEDIC SURGERY

## 2019-04-17 PROCEDURE — 97140 MANUAL THERAPY 1/> REGIONS: CPT

## 2019-04-17 PROCEDURE — 99999 PR PBB SHADOW E&M-EST. PATIENT-LVL III: CPT | Mod: PBBFAC,,, | Performed by: ORTHOPAEDIC SURGERY

## 2019-04-17 PROCEDURE — 97010 HOT OR COLD PACKS THERAPY: CPT

## 2019-04-17 PROCEDURE — 99024 PR POST-OP FOLLOW-UP VISIT: ICD-10-PCS | Mod: S$GLB,,, | Performed by: ORTHOPAEDIC SURGERY

## 2019-04-17 PROCEDURE — 97110 THERAPEUTIC EXERCISES: CPT

## 2019-04-17 PROCEDURE — 99999 PR PBB SHADOW E&M-EST. PATIENT-LVL III: ICD-10-PCS | Mod: PBBFAC,,, | Performed by: ORTHOPAEDIC SURGERY

## 2019-04-17 NOTE — PROGRESS NOTES
CC right shoulder pain    HISTORY OF PRESENT ILLNESS:   Pt is here today for post-operative followup of his shoulder arthroscopy.  he is doing well.  We have reviewed his findings and discussed plan of care and future treatment options.                 Patient has been attending physical therapy at the [facility] location, working with Marcellus.    Doing well in PT  90% better vs preop  SANE 60    DATE OF PROCEDURE: 03/07/2019  OPERATION:   right  1. Shoulder arthroscopic partial thickness cuff debridement (internal impingement)  2. Shoulder arthroscopic posterior labral debridement  3. Shoulder arthroscopic extensive debridement (anterior, posterior glenohumeral joint) (CPT 83636)                                                                    PHYSICAL EXAMINATION:     Incision sites healed well  No evidence of any erythema, infection or induration  elbow Range of motion full   No effusion  2+ DP pulse  No swelling  - moving valgus  Full ROM  - Obriens                                                                                ASSESSMENT:                                                                                                                                               1. Status post above, doing well.                                                                                                                               PLAN:                                                                                                                                                     1. Continue PT  2. Emphasized scapular function.  3. I have discussed return to activity in detail, may be able to begin throwing at 3 months status post.  4. he will see us back in 4 weeks.                                      5. All questions were answered and he should contact us if he  has any questions or concerns in the interim.                                                           POST OPERATIVE PLAN: We will follow  the arthroscopic debridement guidelines. We discussed with the patient's family after surgery. The patient will remain in a sling for 1-2 weeks.  Wean sling x 1-2 weeks, no ROM restrictions.

## 2019-04-17 NOTE — PROGRESS NOTES
Physical Therapy Daily Treatment Note     Name: Chago Douglas  Clinic Number: [id number]    Therapy Diagnosis:   No diagnosis found.  Physician: Gilmar Arnold III, *    Visit Date: 4/17/2019    Physician Orders: eval and treat  Medical Diagnosis:    M75.41 (ICD-10-CM) - Impingement syndrome of right shoulder   M75.21 (ICD-10-CM) - Biceps tendinitis of right upper extremity   M25.511,G89.29 (ICD-10-CM) - Chronic right shoulder pain   M75.101 (ICD-10-CM) - Rotator cuff syndrome of right shoulder         Date of Surgery: 3/7/19  Evaluation Date: 3/13/2019  Authorization Period Expiration: 12/31/19  Plan of Care Certification Period: 7/3/19  Visit # / Visits authorized: 6/ 25      Time In: 1100  Time Out: 1155  Total Billable Time: 45 minutes    Precautions: Standard  PLAN:                                                                                                                                                     1. Continue PT  2. Emphasized scapular function.  3. I have discussed return to activity in detail, may be able to begin throwing at 3 months status post.  4. he will see us back in 4 weeks.                                      5. All questions were answered and he should contact us if he  has any questions or concerns in the interim.                                                            POST OPERATIVE PLAN: We will follow the arthroscopic debridement guidelines. We discussed with the patient's family after surgery. The patient will remain in a sling for 1-2 weeks.  Wean sling x 1-2 weeks, no ROM restrictions.      Subjective     Pt reports: His R shoulder feels normal. His f/u went well and Dr. Nelson says he can begin throwing in 3 weeks with next f/u in 4 weeks to assess. Cleared for running/LE lifts.      He was compliant with home exercise program.  Response to previous treatment: no soreness  Functional change: improved AROM/strength    Pain: 0/10  Location: right shoulder      CMS  Impairment/Limitation/Restriction for FOTO Shoulder Survey  Status Limitation G-Code CMS Severity Modifier  Intake 44% 56%  Predicted 78% 22% Goal Status+ CJ - At least 20 percent but less than 40 percent  2019 72% 28% Current Status CJ - At least 20 percent but less than 40 percent  D/C Status CJ **only report if this is discharge survey    Objective     POD 41    Full AROM and = mitul no pain except HBB reaching 1 inch from inferior scap border- just tight no pain    MMT IR at 90 de/5 no pain;  ER at 90 deg 4-/5 no pain    Chago received therapeutic exercises to develop strength, ROM and flexibility for 35 minutes including:  PROM R shoulder all planes  Standing SA punch GTB x 30  Horizontal abduction Black band 2 x 10  SL ER 3-4 lbs 3 x 10  Pull downs 3 x 10 scapular squeeze PTB  TRX row 3 x 15  TRX push up x6, x8, x8  Standing Y and T 2 x 10 each 2 lbs with scap squeeze in mirror  UBE 4/4 lv 3-4  Pulleys flx, HBB, and abduction 2 min each  Standing D2/D1 cables 3 x 10- NP  IR/ER standing at 90 deg 3 x 15 each cables 7 lbs IR and 3.5 lbs ER    Chago received the following manual therapy techniques: Joint mobilizations were applied to the: R shoulder for 8 minutes, including:  AP, inferior glides grade 3/4  Scapular mobilizations all planes grade 3    Ice x 10 min post tx      Home Exercises Provided and Patient Education Provided     Education provided:   - Pt was educated and demonstrated good understanding of post-op precautions, timeframe for recovery, prognosis, expectations for rehab, normal and expected soreness, activity modification/avoidance/pacing, use of ICE/heat, load v rest ratio, throwing progression/criteria, conditioning program    Written Home Exercises Provided: Patient instructed to cont prior HEP.  Exercises were reviewed and Chago was able to demonstrate them prior to the end of the session.  Chago demonstrated good  understanding of the education provided.     See EMR under Media for  exercises provided prior visit.    Assessment     Pt had a good f/u and was given green light to proceed with loading toward return to throwing in 3 weeks from today. Progressing strength and motor control exercises to functional throwing positions with no complaints other than increased fatigue. He reported feeling better with reps in session. FOTO score increased indicating overall functional improvements due to less pain and improved ROM/strength. Pt reported/demonstrated no adverse response or exacerbation of symptoms during today's session.     Chago is progressing well towards his goals.   Pt prognosis is Excellent.     Pt will continue to benefit from skilled outpatient physical therapy to address the deficits listed in the problem list box on initial evaluation, provide pt/family education and to maximize pt's level of independence in the home and community environment.     Pt's spiritual, cultural and educational needs considered and pt agreeable to plan of care and goals.     Anticipated barriers to physical therapy: none noted/reported    Short Term GOALS:  In 4-8 weeks, pt. will:  1. Pt will increase ROM to the to R shoulder to > 160 deg in order to perform ADLs and IADLs more effectively (met)  2. Decrease overall pain to 2/10 on average with daily activities  (met 4/17/2019)  3. Increase strength to the R shoulder to 4/5 in order to perform ADLs and IADLs more effectively progressing, not met)  4. Improve FOTO intake score to 80 to demonstrate improvement with functional mobility and use progressing, not met)  5. Independent with HEP progressing, not met)  Long Term GOALS:  In 8-16 weeks, pt. will:  1. Pt will increase ROM to the R shoulder external rotation at 90 to 95 deg in order to perform ADLs and IADLs more effectively (met 4/17/2019)  2. Decrease overall pain to 0/10 on average with daily activities progressing, not met)  3. Increase strength to the R shoulder to 5/5 in order to perform ADLs and  IADLs more effectively progressing, not met)  4. Improve FOTO intake score to 90 to demonstrate improvement with functional mobility and use progressing, not met)  5. Independent with HEP progressing, not met)  6. Return to full participation with baseball unrestricted.(progressing, not met)    Plan     Continue with shoulder AROM and periscapular/glenohumeral strength as tolerated. Advance exercises as tolerated for return to throwing.     Kunal Ivy, PT

## 2019-05-01 ENCOUNTER — CLINICAL SUPPORT (OUTPATIENT)
Dept: REHABILITATION | Facility: HOSPITAL | Age: 24
End: 2019-05-01
Attending: ORTHOPAEDIC SURGERY
Payer: COMMERCIAL

## 2019-05-01 DIAGNOSIS — M25.60 RANGE OF MOTION DEFICIT: ICD-10-CM

## 2019-05-01 PROCEDURE — 97110 THERAPEUTIC EXERCISES: CPT

## 2019-05-01 PROCEDURE — 97140 MANUAL THERAPY 1/> REGIONS: CPT

## 2019-05-01 NOTE — PROGRESS NOTES
Physical Therapy Daily Treatment Note     Name: Chago Douglas  Clinic Number: [id number]    Therapy Diagnosis:   Encounter Diagnosis   Name Primary?    Range of motion deficit      Physician: Gilmar Arnold III, *    Visit Date: 5/1/2019    Physician Orders: eval and treat  Medical Diagnosis:    M75.41 (ICD-10-CM) - Impingement syndrome of right shoulder   M75.21 (ICD-10-CM) - Biceps tendinitis of right upper extremity   M25.511,G89.29 (ICD-10-CM) - Chronic right shoulder pain   M75.101 (ICD-10-CM) - Rotator cuff syndrome of right shoulder         Date of Surgery: 3/7/19  Evaluation Date: 3/13/2019  Authorization Period Expiration: 12/31/19  Plan of Care Certification Period: 7/3/19  Visit # / Visits authorized: 7/ 25      Time In: 2:00  Time Out: 2:55  Total Billable Time: 55 minutes    Precautions: Standard  PLAN:                                                                                                                                                     1. Continue PT  2. Emphasized scapular function.  3. I have discussed return to activity in detail, may be able to begin throwing at 3 months status post.  4. he will see us back in 4 weeks.                                      5. All questions were answered and he should contact us if he  has any questions or concerns in the interim.                                                            POST OPERATIVE PLAN: We will follow the arthroscopic debridement guidelines. We discussed with the patient's family after surgery. The patient will remain in a sling for 1-2 weeks.  Wean sling x 1-2 weeks, no ROM restrictions.      Subjective     Pt reports: His R shoulder feels normal. Played basketball no problems 2 days ago. No new complaints.      He was compliant with home exercise program.  Response to previous treatment: no soreness  Functional change: improved AROM/strength    Pain: 0/10  Location: right shoulder      Objective     POD 55(1 mo 24 days)    Full  AROM and = mitul no pain except HBB reaching; able to reach scap border, but does lean trunk fwd just tight no pain  PROM  deg no pain    MMT IR at 90 de+/5 no pain;  ER at 90 deg 4/5 no pain    Chago received therapeutic exercises to develop strength, ROM, endurance, stability, and flexibility for 45 minutes including:    Standing SA punch GTB x 30- NP  Horizontal abduction Black band 2 x 10- NP  SL ER 3-4 lbs 4 x 10  Prone Y 2 lbs 3 x 10 (move to quadruped)  Prone T 5 lbs   3 x 10    quadruped row  15-20 lbs  3 x 10  Prone ext 10 lbs  3 x 10  Prone ER at 90  3 x 10  Pull downs 3 x 10 scapular squeeze PTB  TRX row 3 x 15- NP  TRX push up x6, x8, x8- NP  Standing Y and T 2 x 10 each 2 lbs with scap squeeze in mirror  UBE 4/4 lv 3-4  Standing D2/D1 cables 3 x 10- NP  IR/ER standing at 90 deg 3 x 15 each cables 7 lbs IR and 3.5 lbs ER- NP  Bodyblade in flx, abd, ER-90 deg, ( add D1/2 movements/SLS)   Lunge stance crossbody press 3 x 10 mitul cable machine 17-20 lbs  Medicine ball chest passes at rebounder 2 x 10  Med ball slams 2 x 10  IR plyo at rebounder at 90 deg 2 x 10    Chago received the following manual therapy techniques: Joint mobilizations were applied to the: R shoulder for 10 minutes, including:  AP, inferior glides grade 3/4  Scapular mobilizations all planes grade 3  Manual D1/2 supine  rhythmic stabilization  PROM R shoulder all planes      Home Exercises Provided and Patient Education Provided     Education provided:   - Pt was educated and demonstrated good understanding of post-op precautions, timeframe for recovery, prognosis, expectations for rehab, normal and expected soreness, activity modification/avoidance/pacing, use of ICE/heat, load v rest ratio, throwing progression/criteria, conditioning program    Written Home Exercises Provided: Patient instructed to cont prior HEP.  Exercises were reviewed and Chago was able to demonstrate them prior to the end of the session.  Chago demonstrated  good  understanding of the education provided.     See EMR under Media for exercises provided prior visit.    Assessment     Pt continues to progress well towards intiaiting throwing program once his strength is full on R with clearance from MD in another month or so. His ROM is doing well. Moved into plyometrics and dynamic upper quarter strength with LE kinematic chain loading today with no complaints. Working into conditioning as well, only complaints are fatigue. Pt reported/demonstrated no adverse response or exacerbation of symptoms during today's session.     Chago is progressing well towards his goals.   Pt prognosis is Excellent.     Pt will continue to benefit from skilled outpatient physical therapy to address the deficits listed in the problem list box on initial evaluation, provide pt/family education and to maximize pt's level of independence in the home and community environment.     Pt's spiritual, cultural and educational needs considered and pt agreeable to plan of care and goals.     Anticipated barriers to physical therapy: none noted/reported    Short Term GOALS:  In 4-8 weeks, pt. will:  1. Pt will increase ROM to the to R shoulder to > 160 deg in order to perform ADLs and IADLs more effectively (met)  2. Decrease overall pain to 2/10 on average with daily activities  (met 4/17/2019)  3. Increase strength to the R shoulder to 4/5 in order to perform ADLs and IADLs more effectively (met)  4. Improve FOTO intake score to 80 to demonstrate improvement with functional mobility and use progressing, not met)  5. Independent with HEP (met)  Long Term GOALS:  In 8-16 weeks, pt. will:  1. Pt will increase ROM to the R shoulder external rotation at 90 to 95 deg in order to perform ADLs and IADLs more effectively (met 4/17/2019)  2. Decrease overall pain to 0/10 on average with daily activities (met)  3. Increase strength to the R shoulder to 5/5 in order to perform ADLs and IADLs more effectively  progressing, not met)  4. Improve FOTO intake score to 90 to demonstrate improvement with functional mobility and use progressing, not met)  5. Independent with HEP progressing, not met)  6. Return to full participation with baseball unrestricted.(progressing, not met)    Plan     Continue with periscapular/glenohumeral strength as tolerated. Progress plyometrics and advance exercises as tolerated for return to throwing program at 3 months post-op.     Kunal Ivy, PT

## 2019-05-29 ENCOUNTER — OFFICE VISIT (OUTPATIENT)
Dept: SPORTS MEDICINE | Facility: CLINIC | Age: 24
End: 2019-05-29
Payer: COMMERCIAL

## 2019-05-29 VITALS
BODY MASS INDEX: 22.96 KG/M2 | HEART RATE: 94 BPM | HEIGHT: 71 IN | DIASTOLIC BLOOD PRESSURE: 77 MMHG | SYSTOLIC BLOOD PRESSURE: 128 MMHG | WEIGHT: 164 LBS

## 2019-05-29 DIAGNOSIS — M75.41 IMPINGEMENT SYNDROME OF RIGHT SHOULDER: Primary | ICD-10-CM

## 2019-05-29 PROCEDURE — 99999 PR PBB SHADOW E&M-EST. PATIENT-LVL III: ICD-10-PCS | Mod: PBBFAC,,, | Performed by: ORTHOPAEDIC SURGERY

## 2019-05-29 PROCEDURE — 99999 PR PBB SHADOW E&M-EST. PATIENT-LVL III: CPT | Mod: PBBFAC,,, | Performed by: ORTHOPAEDIC SURGERY

## 2019-05-29 PROCEDURE — 99024 PR POST-OP FOLLOW-UP VISIT: ICD-10-PCS | Mod: S$GLB,,, | Performed by: ORTHOPAEDIC SURGERY

## 2019-05-29 PROCEDURE — 99024 POSTOP FOLLOW-UP VISIT: CPT | Mod: S$GLB,,, | Performed by: ORTHOPAEDIC SURGERY

## 2019-05-29 NOTE — PROGRESS NOTES
CC right shoulder pain    HISTORY OF PRESENT ILLNESS:   Pt is here today for post-operative followup of his shoulder arthroscopy.  he is doing well.  We have reviewed his findings and discussed plan of care and future treatment options.                 Patient has been attending physical therapy at the [facility] location, working with Marcellus.    Doing well in PT  100% better vs preop  SANE 90    DATE OF PROCEDURE: 03/07/2019  OPERATION:   right  1. Shoulder arthroscopic partial thickness cuff debridement (internal impingement)  2. Shoulder arthroscopic posterior labral debridement  3. Shoulder arthroscopic extensive debridement (anterior, posterior glenohumeral joint) (CPT 88495)                                                                    PHYSICAL EXAMINATION:     Incision sites healed well  No evidence of any erythema, infection or induration  elbow Range of motion full   No effusion  2+ DP pulse  No swelling  - moving valgus  Full ROM  - Obriens                                                                                ASSESSMENT:                                                                                                                                               1. Status post above, doing well.                                                                                                                               PLAN:                                                                                                                                                     1. Continue PT  2. Emphasized scapular function.  3. I have discussed return to activity in detail, may be able to begin throwing at 3 months status post.  4. he will see us back in 4 weeks.                                      5. All questions were answered and he should contact us if he  has any questions or concerns in the interim.                                                           POST OPERATIVE PLAN: We will  follow the arthroscopic debridement guidelines. We discussed with the patient's family after surgery. The patient will remain in a sling for 1-2 weeks.  Wean sling x 1-2 weeks, no ROM restrictions.

## 2019-06-17 ENCOUNTER — CLINICAL SUPPORT (OUTPATIENT)
Dept: REHABILITATION | Facility: HOSPITAL | Age: 24
End: 2019-06-17
Attending: ORTHOPAEDIC SURGERY
Payer: COMMERCIAL

## 2019-06-17 DIAGNOSIS — R29.898 WEAKNESS OF SHOULDER: ICD-10-CM

## 2019-06-17 DIAGNOSIS — M25.60 RANGE OF MOTION DEFICIT: ICD-10-CM

## 2019-06-17 PROCEDURE — 97140 MANUAL THERAPY 1/> REGIONS: CPT

## 2019-06-17 PROCEDURE — 97110 THERAPEUTIC EXERCISES: CPT

## 2019-06-17 NOTE — PLAN OF CARE
Physical Therapy updated POC/progress Note     Name: Chago Douglas  Clinic Number: [id number]    Therapy Diagnosis:   Encounter Diagnoses   Name Primary?    Range of motion deficit     Weakness of shoulder      Physician: Gilmar Arnold III, *    Visit Date: 6/17/2019    Physician Orders: eval and treat  Medical Diagnosis:    M75.41 (ICD-10-CM) - Impingement syndrome of right shoulder   M75.21 (ICD-10-CM) - Biceps tendinitis of right upper extremity   M25.511,G89.29 (ICD-10-CM) - Chronic right shoulder pain   M75.101 (ICD-10-CM) - Rotator cuff syndrome of right shoulder         Date of Surgery: 3/7/19  Evaluation Date: 3/13/2019  Authorization Period Expiration: 12/31/19  Plan of Care Certification Period: 08/30/2019  Visit # / Visits authorized: 8/ 25      Time In: 5:00  Time Out: 5:45  Total Billable Time: 45 minutes    Precautions: Standard  PLAN:                                                                                                                                                     1. Continue PT  2. Emphasized scapular function.  3. I have discussed return to activity in detail, may be able to begin throwing at 3 months status post.  4. he will see us back in 4 weeks.                                      5. All questions were answered and he should contact us if he  has any questions or concerns in the interim.                                                            POST OPERATIVE PLAN: We will follow the arthroscopic debridement guidelines. We discussed with the patient's family after surgery. The patient will remain in a sling for 1-2 weeks.  Wean sling x 1-2 weeks, no ROM restrictions.      Subjective     Pt reports: Pt's last PT appt was 5/1/2019; missed due to school; tried throwing 2 wks ago x 20 tosses at 45 ft approx- pain in late cocking towards last few reps. He feels 50% overall. He states he is likely not going back to team baseball at [facility] [employer], but would like to get back to  recreational baseball throwing. He notes soreness from sleeping on R side and recent crepitus in R UE. He has not been doing any HEP as well since last PT appt. No problems with ADLs at this time.      He was compliant with home exercise program.  Response to previous treatment: no soreness  Functional change: improved AROM/strength    Pain: 0/10  Location: right shoulder      Objective     POD 3 mo 10 days    Full AROM and = mitul except HBB reaching; able to reach scap border, but does lean trunk fwd just tight no pain any plane reaching    AROM/PROM ER((0 deg abd) 95/120 deg no pain    MMT IR at 90 de+/5 no pain;  ER at 90 deg 4/5 no pain; grossly 4 to 4+/5 all planes GH and scapular on R; grossly 4+/5 to 5-/5 on L    Chago received therapeutic exercises to develop strength, ROM, endurance, stability, and flexibility for 35 minutes including:    SA punch supine 15 lbs 3 x 10  Quad row 26.4 lbs KB 3 x 10 mitul  Standing T and Y 3 x 10 each 5 lbs at mirror  Cable ER at 90 with row 3 x 8 reps 3 lbs  Cable IR at 90 deg 3 x 10 7 lbs  SL ER 3 lbs 4 x 10  Rhythmic stab flx, abd and ER at 90 deg  Tests and measures  OH stability- NP  PROM R shoulder all planes; manual stretching    Chago received the following manual therapy techniques: Joint mobilizations were applied to the: R shoulder for 10 minutes, including:  AP, inferior glides grade 3/4  Scapular mobilizations all planes grade 3         Home Exercises Provided and Patient Education Provided     Education provided:   - Pt was educated and demonstrated good understanding of post-op precautions, timeframe for recovery, prognosis, expectations for rehab, normal and expected soreness, activity modification/avoidance/pacing, use of ICE/heat, load v rest ratio, throwing progression/criteria, conditioning program    Written Home Exercises Provided: Patient instructed to cont prior HEP.  Exercises were reviewed and Chago was able to demonstrate them prior to the end of the  session.  Chago demonstrated good  understanding of the education provided.     See EMR under Media for exercises provided prior visit.    Assessment     Pt has not been in clinic since 5/1/2019 due to school schedule. His ROM is doing well at this point, however his strength and stability especially overhead is limited. He is not ready for return to throwing program yet, spoke with MD who agrees. He is not returning to sport on team, but would like to return to throwing baseball/football.  Pt reported/demonstrated no adverse response or exacerbation of symptoms during today's session.     Chago is progressing well towards his goals.   Pt prognosis is Excellent.     Pt will continue to benefit from skilled outpatient physical therapy to address the deficits listed in the problem list box on initial evaluation, provide pt/family education and to maximize pt's level of independence in the home and community environment.     Pt's spiritual, cultural and educational needs considered and pt agreeable to plan of care and goals.     Anticipated barriers to physical therapy: none noted/reported    Short Term GOALS:  In 4-8 weeks, pt. will:  1. Pt will increase ROM to the to R shoulder to > 160 deg in order to perform ADLs and IADLs more effectively (met)  2. Decrease overall pain to 2/10 on average with daily activities  (met 4/17/2019)  3. Increase strength to the R shoulder to 4/5 in order to perform ADLs and IADLs more effectively (met)  4. Improve FOTO intake score to 80 to demonstrate improvement with functional mobility and use progressing, not met)  5. Independent with HEP (met)  Long Term GOALS:  In 8-16 weeks, pt. will:  1. Pt will increase ROM to the R shoulder external rotation at 90 to 95 deg in order to perform ADLs and IADLs more effectively (met 4/17/2019)  2. Decrease overall pain to 0/10 on average with daily activities (met)  3. Increase strength to the R shoulder to 5/5 and > 90% HHD in order to progress to  throwing program (progressing, not met)  4. Improve FOTO intake score to 90 to demonstrate improvement with functional mobility and use progressing, not met)  5. Independent with HEP progressing, not met)  6. Return to full participation with baseball unrestricted.(progressing, not met)    Plan     Focus strength/OH stability, once passing HHD > 90% return to throwing program.     Certification Period: 6/17/2019 to 08/30/2019  Recommended Treatment Plan: 2 times per week for 8 weeks: Electrical Stimulation TENs/IFC/NMES, Manual Therapy, Moist Heat/ Ice, Neuromuscular Re-ed, Patient Education, Self Care, Therapeutic Activites, Therapeutic Exercise and IASTM, dry needling, taping as needed.   Other Recommendations: modalities prn, ASTYM prn, kinesiotape prn, Functional Dry Needling prn       Therapist: Kunal Ivy, PT

## 2019-07-03 ENCOUNTER — CLINICAL SUPPORT (OUTPATIENT)
Dept: REHABILITATION | Facility: HOSPITAL | Age: 24
End: 2019-07-03
Attending: ORTHOPAEDIC SURGERY
Payer: COMMERCIAL

## 2019-07-03 DIAGNOSIS — R29.898 WEAKNESS OF SHOULDER: ICD-10-CM

## 2019-07-03 DIAGNOSIS — M25.60 RANGE OF MOTION DEFICIT: ICD-10-CM

## 2019-07-03 PROCEDURE — 97110 THERAPEUTIC EXERCISES: CPT

## 2019-07-03 PROCEDURE — 97112 NEUROMUSCULAR REEDUCATION: CPT

## 2019-07-03 NOTE — PROGRESS NOTES
Physical Therapy Daily Treatment Note     Name: Chago Douglas  Clinic Number: [id number]    Therapy Diagnosis:   Encounter Diagnoses   Name Primary?    Weakness of shoulder     Range of motion deficit      Physician: Gilmar Arnold III, *    Visit Date: 7/3/2019    Physician Orders: eval and treat  Medical Diagnosis:    M75.41 (ICD-10-CM) - Impingement syndrome of right shoulder   M75.21 (ICD-10-CM) - Biceps tendinitis of right upper extremity   M25.511,G89.29 (ICD-10-CM) - Chronic right shoulder pain   M75.101 (ICD-10-CM) - Rotator cuff syndrome of right shoulder         Date of Surgery: 3/7/19  Evaluation Date: 3/13/2019  Authorization Period Expiration: 12/31/19  Plan of Care Certification Period: 8/30/2019  Visit # / Visits authorized: 9/ 25      Time In: 3:00  Time Out: 3:45  Total Billable Time: 45 minutes    Precautions: Standard  PLAN:                                                                                                                                                     1. Continue PT  2. Emphasized scapular function.  3. I have discussed return to activity in detail, may be able to begin throwing at 3 months status post.  4. he will see us back in 4 weeks.                                      5. All questions were answered and he should contact us if he  has any questions or concerns in the interim.                                                            POST OPERATIVE PLAN: We will follow the arthroscopic debridement guidelines. We discussed with the patient's family after surgery. The patient will remain in a sling for 1-2 weeks.  Wean sling x 1-2 weeks, no ROM restrictions.      Subjective     Pt reports: His shoulder feels good. Unable to make recent appts and do HEP due to 3 summer classes going on with exams.  He is not doing any conditioning, weights, throwing of any kind.      He was not compliant with home exercise program.  Response to previous treatment: no soreness  Functional  change: improved AROM/strength    Pain: 0/10  Location: right shoulder      Objective     POD 3 mo 27 days     Chago received therapeutic exercises to develop strength, ROM, endurance, stability, and flexibility for 25 minutes including:     SA punch supine 15 lbs 3 x 10- NP  Cable row with mini squat 4 x 10 20-30 lbs each UE  Standing T and Y 3 x 10 each 5 lbs at mirror-NP  Cable ER at 90 with row 3 x 10 reps 3 lbs  Cable IR at 90 deg 3 x 10 7 lbs  SL ER 3 lbs 4 x 10- NP  Rhythmic stab flx, abd and ER at 90 deg- NP  PROM R shoulder all planes; manual stretching- NP  Elliptical 8 min    Neuro re-ed for stabilization x 20 min  Body blade PNF- NP  Cable D1/2 3 x 10 each 3-10 lbs  OH stability- NP  Bosu push ups 3 x 10 reps  Body blade in flx, abd 90 deg and ER 90/90 2 x 30 sec each  CKC shoulder taps/cone taps Y balance- NP    Chago received the following manual therapy techniques: Joint mobilizations were applied to the: R shoulder for 0 minutes, including:  AP, inferior glides grade 3/4-NP  Scapular mobilizations all planes grade 3 -NP      Home Exercises Provided and Patient Education Provided     Education provided:   - Pt was educated and demonstrated good understanding of post-op precautions, timeframe for recovery, prognosis, expectations for rehab, normal and expected soreness, activity modification/avoidance/pacing, use of ICE/heat, load v rest ratio, throwing progression/criteria, conditioning program    Written Home Exercises Provided: Patient instructed to cont prior HEP.  Exercises were reviewed and Chago was able to demonstrate them prior to the end of the session.  Chago demonstrated good  understanding of the education provided.     See EMR under Media for exercises provided prior visit.    Assessment     Pt has been non-compliant in clinic visits as well as HEP due to summer classes. He is limiting progress in strengthening/endurance and conditioning, delaying return to throwing process. Pt fatigued  quickly in session, however no pain reported. His ROM remains full without limitation. Pt reported/demonstrated no adverse response or exacerbation of symptoms during today's session.     Chago is progressing well towards his goals.    Pt prognosis is Excellent.     Pt will continue to benefit from skilled outpatient physical therapy to address the deficits listed in the problem list box on initial evaluation, provide pt/family education and to maximize pt's level of independence in the home and community environment.     Pt's spiritual, cultural and educational needs considered and pt agreeable to plan of care and goals.     Anticipated barriers to physical therapy: none noted/reported    Short Term GOALS:  In 4-8 weeks, pt. will:  1. Pt will increase ROM to the to R shoulder to > 160 deg in order to perform ADLs and IADLs more effectively (met)  2. Decrease overall pain to 2/10 on average with daily activities  (met 4/17/2019)  3. Increase strength to the R shoulder to 4/5 in order to perform ADLs and IADLs more effectively (met)  4. Improve FOTO intake score to 80 to demonstrate improvement with functional mobility and use progressing, not met)  5. Independent with HEP (met)  Long Term GOALS:  In 8-16 weeks, pt. will:  1. Pt will increase ROM to the R shoulder external rotation at 90 to 95 deg in order to perform ADLs and IADLs more effectively (met 4/17/2019)  2. Decrease overall pain to 0/10 on average with daily activities (met)  3. Increase strength to the R shoulder to 5/5 in order to perform ADLs and IADLs more effectively progressing, not met)  4. Improve FOTO intake score to 90 to demonstrate improvement with functional mobility and use progressing, not met)  5. Independent with HEP progressing, not met)  6. Return to full participation with baseball unrestricted.(progressing, not met)    Plan     Focus strength/OH stability, once passing HHD > 90% return to throwing program.     Kunal Ivy, PT

## 2019-07-08 ENCOUNTER — CLINICAL SUPPORT (OUTPATIENT)
Dept: REHABILITATION | Facility: HOSPITAL | Age: 24
End: 2019-07-08
Attending: ORTHOPAEDIC SURGERY
Payer: COMMERCIAL

## 2019-07-08 DIAGNOSIS — R29.898 WEAKNESS OF SHOULDER: ICD-10-CM

## 2019-07-08 DIAGNOSIS — M25.60 RANGE OF MOTION DEFICIT: ICD-10-CM

## 2019-07-08 PROCEDURE — 97110 THERAPEUTIC EXERCISES: CPT

## 2019-07-08 PROCEDURE — 97112 NEUROMUSCULAR REEDUCATION: CPT

## 2019-07-08 NOTE — PROGRESS NOTES
Physical Therapy Progress Note     Name: Chago Douglas  Clinic Number: [id number]    Therapy Diagnosis:   Encounter Diagnoses   Name Primary?    Weakness of shoulder     Range of motion deficit      Physician: Gilmar Arnold III, *    Visit Date: 7/8/2019    Physician Orders: eval and treat  Medical Diagnosis:    M75.41 (ICD-10-CM) - Impingement syndrome of right shoulder   M75.21 (ICD-10-CM) - Biceps tendinitis of right upper extremity   M25.511,G89.29 (ICD-10-CM) - Chronic right shoulder pain   M75.101 (ICD-10-CM) - Rotator cuff syndrome of right shoulder         Date of Surgery: 3/7/19  Evaluation Date: 3/13/2019  Authorization Period Expiration: 12/31/19  Plan of Care Certification Period: 8/30/2019  Visit # / Visits authorized: 10/ 25      Time In: 3:28  Time Out: 4:00  Total Billable Time: 30 minutes    Precautions: Standard  PLAN:                                                                                                                                                     1. Continue PT  2. Emphasized scapular function.  3. I have discussed return to activity in detail, may be able to begin throwing at 3 months status post.  4. he will see us back in 4 weeks.                                      5. All questions were answered and he should contact us if he  has any questions or concerns in the interim.                                                            POST OPERATIVE PLAN: We will follow the arthroscopic debridement guidelines. We discussed with the patient's family after surgery. The patient will remain in a sling for 1-2 weeks.  Wean sling x 1-2 weeks, no ROM restrictions.      Subjective     Pt reports: Pt late due to traffic. His states his shoulder feels good. He is not doing any conditioning, weights, throwing of any kind.      He was not compliant with home exercise program.  Response to previous treatment: no soreness  Functional change: improved AROM/strength    Pain: 0/10  Location:  right shoulder      Objective     POD 4 mo 1 day     Chago received therapeutic exercises to develop strength, ROM, endurance, stability, and flexibility for 20 minutes including:     SA punch supine 15 lbs 3 x 10- NP  Cable row with mini squat 3 x 15 20 lbs each UE  Standing T and Y 3 x 10 each 5 lbs at mirror-NP  Cable ER at 90 with row 3 x 10 reps 3 lbs  Cable IR at 90 deg 3 x 10 7-17 lbs  SL ER 3 lbs 4 x 10  Rhythmic stab flx, abd and ER at 90 deg  PROM R shoulder all planes; manual stretching  Elliptical 8 min-NP  Quadruped row 3 x 10 mitul 24.6 lbs    Neuro re-ed for stabilization x 10 min  Body blade PNF D1/2 2 x 30 sec  Cable D1/2 3 x 10 each 3-10 lbs  OH stability- NP  Bosu push ups 3 x 10 reps-NP  Body blade in flx, abd 90 deg and ER 90/90 2 x 30 sec each  CKC shoulder taps/cone taps Y balance- NP  plalloff press at 90 and 120 deg x 15 mitul yellow sport band    Chago received the following manual therapy techniques: Joint mobilizations were applied to the: R shoulder for 0 minutes, including:  AP, inferior glides grade 3/4-NP  Scapular mobilizations all planes grade 3 -NP      Home Exercises Provided and Patient Education Provided     Education provided:   - Pt was educated and demonstrated good understanding of post-op precautions, timeframe for recovery, prognosis, expectations for rehab, normal and expected soreness, activity modification/avoidance/pacing, use of ICE/heat, load v rest ratio, throwing progression/criteria, conditioning program    Written Home Exercises Provided: Patient instructed to cont prior HEP.  Exercises were reviewed and Chago was able to demonstrate them prior to the end of the session.  Chago demonstrated good  understanding of the education provided.     See EMR under Media for exercises provided prior visit.    Assessment     Pt arrived 30 min late to session today. Progressing strength and muscular stability/control for throwing mechanics as able. Throwing program not started due to  him being late, may start next session if passing strength testing. His ROM remains full, strength progressing. Pt reported/demonstrated no adverse response or exacerbation of symptoms during today's session.     Chago is progressing well towards his goals.    Pt prognosis is Excellent.     Pt will continue to benefit from skilled outpatient physical therapy to address the deficits listed in the problem list box on initial evaluation, provide pt/family education and to maximize pt's level of independence in the home and community environment.     Pt's spiritual, cultural and educational needs considered and pt agreeable to plan of care and goals.     Anticipated barriers to physical therapy: none noted/reported    Short Term GOALS:  In 4-8 weeks, pt. will:  1. Pt will increase ROM to the to R shoulder to > 160 deg in order to perform ADLs and IADLs more effectively (met)  2. Decrease overall pain to 2/10 on average with daily activities  (met 4/17/2019)  3. Increase strength to the R shoulder to 4/5 in order to perform ADLs and IADLs more effectively (met)  4. Improve FOTO intake score to 80 to demonstrate improvement with functional mobility and use progressing, not met)  5. Independent with HEP (met)  Long Term GOALS:  In 8-16 weeks, pt. will:  1. Pt will increase ROM to the R shoulder external rotation at 90 to 95 deg in order to perform ADLs and IADLs more effectively (met 4/17/2019)  2. Decrease overall pain to 0/10 on average with daily activities (met)  3. Increase strength to the R shoulder to 5/5 in order to perform ADLs and IADLs more effectively progressing, not met)  4. Improve FOTO intake score to 90 to demonstrate improvement with functional mobility and use progressing, not met)  5. Independent with HEP progressing, not met)  6. Return to full participation with baseball unrestricted.(progressing, not met)    Plan     Focus strength/OH stability, once passing HHD > 90% return to throwing program.      Kunal Ivy, PT

## 2019-07-24 ENCOUNTER — OFFICE VISIT (OUTPATIENT)
Dept: SPORTS MEDICINE | Facility: CLINIC | Age: 24
End: 2019-07-24
Payer: COMMERCIAL

## 2019-07-24 VITALS
WEIGHT: 164 LBS | DIASTOLIC BLOOD PRESSURE: 96 MMHG | BODY MASS INDEX: 22.96 KG/M2 | SYSTOLIC BLOOD PRESSURE: 156 MMHG | HEIGHT: 71 IN | HEART RATE: 120 BPM

## 2019-07-24 DIAGNOSIS — Z98.890 STATUS POST ARTHROSCOPY OF RIGHT SHOULDER: Primary | ICD-10-CM

## 2019-07-24 PROCEDURE — 99212 PR OFFICE/OUTPT VISIT, EST, LEVL II, 10-19 MIN: ICD-10-PCS | Mod: S$GLB,,, | Performed by: ORTHOPAEDIC SURGERY

## 2019-07-24 PROCEDURE — 3008F PR BODY MASS INDEX (BMI) DOCUMENTED: ICD-10-PCS | Mod: CPTII,S$GLB,, | Performed by: ORTHOPAEDIC SURGERY

## 2019-07-24 PROCEDURE — 99212 OFFICE O/P EST SF 10 MIN: CPT | Mod: S$GLB,,, | Performed by: ORTHOPAEDIC SURGERY

## 2019-07-24 PROCEDURE — 3008F BODY MASS INDEX DOCD: CPT | Mod: CPTII,S$GLB,, | Performed by: ORTHOPAEDIC SURGERY

## 2019-07-24 PROCEDURE — 99999 PR PBB SHADOW E&M-EST. PATIENT-LVL III: ICD-10-PCS | Mod: PBBFAC,,, | Performed by: ORTHOPAEDIC SURGERY

## 2019-07-24 PROCEDURE — 99999 PR PBB SHADOW E&M-EST. PATIENT-LVL III: CPT | Mod: PBBFAC,,, | Performed by: ORTHOPAEDIC SURGERY

## 2019-07-24 NOTE — PROGRESS NOTES
CC right shoulder pain    HISTORY OF PRESENT ILLNESS:   Pt is here today for post-operative followup of his shoulder arthroscopy.  he is doing well.  We have reviewed his findings and discussed plan of care and future treatment options.                 Patient has been attending physical therapy at the [facility] location, working with Kunal Ivy.  He has been given a throwing program and will begin to follow it.     Doing well in PT  100% better vs preop  SANE 90    DATE OF PROCEDURE: 03/07/2019  OPERATION:   right  1. Shoulder arthroscopic partial thickness cuff debridement (internal impingement)  2. Shoulder arthroscopic posterior labral debridement  3. Shoulder arthroscopic extensive debridement (anterior, posterior glenohumeral joint) (CPT 99096)                                                                    PHYSICAL EXAMINATION:     Incision sites healed well  No evidence of any erythema, infection or induration  elbow Range of motion full   No effusion  2+ DP pulse  No swelling  - moving valgus  Full ROM  - Obriens                                                                                ASSESSMENT:                                                                                                                                               1. Status post above, doing well.                                                                                                                               PLAN:                                                                                                                                                     1. Continue PT  2. Emphasized scapular function.  3. I have discussed return to activity in detail, may be able to begin throwing at 3 months status post.  4. he will see us back in 2 months.                                      5. All questions were answered and he should contact us if he  has any questions or concerns in the interim.                                                            POST OPERATIVE PLAN: We will follow the arthroscopic debridement guidelines. We discussed with the patient's family after surgery. The patient will remain in a sling for 1-2 weeks.  Wean sling x 1-2 weeks, no ROM restrictions.

## 2019-07-31 ENCOUNTER — CLINICAL SUPPORT (OUTPATIENT)
Dept: REHABILITATION | Facility: HOSPITAL | Age: 24
End: 2019-07-31
Attending: ORTHOPAEDIC SURGERY
Payer: COMMERCIAL

## 2019-07-31 DIAGNOSIS — R29.898 WEAKNESS OF SHOULDER: ICD-10-CM

## 2019-07-31 DIAGNOSIS — M25.60 RANGE OF MOTION DEFICIT: ICD-10-CM

## 2019-07-31 PROCEDURE — 97110 THERAPEUTIC EXERCISES: CPT

## 2019-07-31 PROCEDURE — 97530 THERAPEUTIC ACTIVITIES: CPT

## 2020-03-25 ENCOUNTER — DOCUMENTATION ONLY (OUTPATIENT)
Dept: REHABILITATION | Facility: HOSPITAL | Age: 25
End: 2020-03-25

## 2020-03-25 NOTE — PROGRESS NOTES
Physical Therapy Discharge Summary    Name: Chago Douglas  Clinic Number: [id number]  Diagnosis:   M75.41 (ICD-10-CM) - Impingement syndrome of right shoulder   M75.21 (ICD-10-CM) - Biceps tendinitis of right upper extremity   M25.511,G89.29 (ICD-10-CM) - Chronic right shoulder pain   M75.101 (ICD-10-CM) - Rotator cuff syndrome of right shoulder       Physician: Leslie Douglas  Treatment Orders: PT Eval and Treat  No past medical history on file.  Initial visit: 3/13/2019  Date of Last visit: 7/31/2019  Date of Discharge Note: 3/25/2020  Total Visits Received: 11  Missed Visits: See Epic for details  ASSESSMENT   Status Towards Goals Met:   Short Term GOALS:  In 4-8 weeks, pt. will:  1. Pt will increase ROM to the to R shoulder to > 160 deg in order to perform ADLs and IADLs more effectively (met)  2. Decrease overall pain to 2/10 on average with daily activities  (met 4/17/2019)  3. Increase strength to the R shoulder to 4/5 in order to perform ADLs and IADLs more effectively (met)  4. Improve FOTO intake score to 80 to demonstrate improvement with functional mobility and use progressing, not met)  5. Independent with HEP (met)  Long Term GOALS:  In 8-16 weeks, pt. will:  1. Pt will increase ROM to the R shoulder external rotation at 90 to 95 deg in order to perform ADLs and IADLs more effectively (met 4/17/2019)  2. Decrease overall pain to 0/10 on average with daily activities (met)  3. Increase strength to the R shoulder to 5/5 in order to perform ADLs and IADLs more effectively progressing, not met)  4. Improve FOTO intake score to 90 to demonstrate improvement with functional mobility and use progressing, not met)  5. Independent with HEP progressing, not met)  6. Return to full participation with baseball unrestricted.(progressing, not met)  ?  ?  Goals Not achieved and why: Pt did not return to clinic  Discharge reason : Patient self discharge  PLAN   This patient is discharged from Physical Therapy Services.    ?  Therapist: Kunal Ivy, PT

## (undated) DEVICE — TUBE SET INFLOW/OUTFLOW

## (undated) DEVICE — SUPPORT SLING SHOT II MEDIUM

## (undated) DEVICE — DRESSING XEROFORM FOIL PK 1X8

## (undated) DEVICE — CANNULA DRY DOC 7 X 85

## (undated) DEVICE — SEE MEDLINE ITEM 157117

## (undated) DEVICE — PAD ABD 8X10 STERILE

## (undated) DEVICE — PAD SHOULDER CARE POLAR

## (undated) DEVICE — GOWN SMART IMP BREATHABLE XXLG

## (undated) DEVICE — APPLICATOR CHLORAPREP ORN 26ML

## (undated) DEVICE — SOL IRR NACL .9% 3000ML

## (undated) DEVICE — CLOSURE SKIN STERI STRIP 1/2X4

## (undated) DEVICE — SEE MEDLINE ITEM 146420

## (undated) DEVICE — UNDERGLOVES BIOGEL PI SIZE 7.5

## (undated) DEVICE — SEE MEDLINE ITEM 152529

## (undated) DEVICE — PROBE ARTHO ENERGY 90 DEG

## (undated) DEVICE — PAD ELECTRODE STER 1.5X3

## (undated) DEVICE — SEE MEDLINE ITEM 152530

## (undated) DEVICE — SHAVER ULTRAFFR 4.2MM

## (undated) DEVICE — SUT MCRYL PLUS 4-0 PS2 27IN

## (undated) DEVICE — DRAPE STERI INSTRUMENT 1018

## (undated) DEVICE — NDL 18GA X1 1/2 REG BEVEL

## (undated) DEVICE — GLOVE ORTHO PF SZ 8.5

## (undated) DEVICE — GLOVE BIOGEL SKINSENSE PI 7.0

## (undated) DEVICE — GAUZE SPONGE 4X4 12PLY

## (undated) DEVICE — DRAPE STERI U-SHAPED 47X51IN

## (undated) DEVICE — POSITIONER IV ARMBOARD FOAM

## (undated) DEVICE — GLOVE SURGEON SYN PF SZ 9

## (undated) DEVICE — KIT SHOULDER POSITIONER SPIDER

## (undated) DEVICE — BLADE SHAVER 4.5 6/BX

## (undated) DEVICE — SUT 1 36IN PDS II VIO MONO

## (undated) DEVICE — SOL 9P NACL IRR PIC IL

## (undated) DEVICE — SYR 10CC LUER LOCK

## (undated) DEVICE — Device